# Patient Record
Sex: MALE | Race: WHITE | Employment: FULL TIME | URBAN - METROPOLITAN AREA
[De-identification: names, ages, dates, MRNs, and addresses within clinical notes are randomized per-mention and may not be internally consistent; named-entity substitution may affect disease eponyms.]

---

## 2018-07-28 PROBLEM — Z92.89 HISTORY OF NUCLEAR STRESS TEST: Status: ACTIVE | Noted: 2018-07-28

## 2018-07-30 ENCOUNTER — OFFICE VISIT (OUTPATIENT)
Dept: CARDIOLOGY CLINIC | Age: 53
End: 2018-07-30
Payer: COMMERCIAL

## 2018-07-30 VITALS
SYSTOLIC BLOOD PRESSURE: 112 MMHG | DIASTOLIC BLOOD PRESSURE: 80 MMHG | BODY MASS INDEX: 31.7 KG/M2 | HEIGHT: 74 IN | WEIGHT: 247 LBS | HEART RATE: 55 BPM

## 2018-07-30 DIAGNOSIS — I48.0 PAROXYSMAL ATRIAL FIBRILLATION (HCC): ICD-10-CM

## 2018-07-30 DIAGNOSIS — Z92.89 HISTORY OF NUCLEAR STRESS TEST: ICD-10-CM

## 2018-07-30 DIAGNOSIS — R07.2 PRECORDIAL PAIN: Primary | ICD-10-CM

## 2018-07-30 PROCEDURE — 93000 ELECTROCARDIOGRAM COMPLETE: CPT | Performed by: INTERNAL MEDICINE

## 2018-07-30 PROCEDURE — 99244 OFF/OP CNSLTJ NEW/EST MOD 40: CPT | Performed by: INTERNAL MEDICINE

## 2018-07-30 RX ORDER — NEBIVOLOL 5 MG/1
5 TABLET ORAL DAILY
COMMUNITY

## 2018-07-30 NOTE — PROGRESS NOTES
Chief Complaint   Patient presents with    Abnormal Test Results       Patient Active Problem List    Diagnosis Date Noted    Paroxysmal atrial fibrillation ([facility] [address] 07/30/2018     Overview Note:     A.  TANA guided DC cardioversion ([place]):  11/2017      History of nuclear stress test 07/28/2018     Overview Note:     A. Pharm stress 7/26/2018 [place] Muhammad):  \"medium defect of moderate intensity involving the inferior, inferoseptal and inferoapical walls. .moderate reversibility\"  EF 55%         Current Outpatient Prescriptions   Medication Sig Dispense Refill    nebivolol (BYSTOLIC) 5 MG tablet Take 5 mg by mouth daily      rivaroxaban (XARELTO) 20 MG TABS tablet Take 20 mg by mouth      pitavastatin (LIVALO) 4 MG TABS tablet Take by mouth nightly      dapagliflozin (FARXIGA) 5 MG tablet Take 5 mg by mouth every morning       No current facility-administered medications for this visit. No Known Allergies    Vitals:    07/30/18 1138   BP: 112/80   Pulse: 55   Weight: 247 lb (112 kg)   Height: 6' 2\" (1.88 m)       Social History     Social History    Marital status:      Spouse name: N/A    Number of children: N/A    Years of education: N/A     Occupational History    Not on file. Social History Main Topics    Smoking status: Never Smoker    Smokeless tobacco: Never Used    Alcohol use Yes    Drug use: No    Sexual activity: Not on file     Other Topics Concern    Not on file     Social History Narrative    No narrative on file       History reviewed. No pertinent family history. SUBJECTIVE: Sissy Parra presents to the office today for consult (dr Mannie Duarte) for -evaluation of cardiac diagnoses and abnormal outside stress. Percell Brakeman He complains of chest pain and denies   dyspnea and syncope. 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   Had dc cardioversion last fall with [employer]. Now with intermittent left sided chest discomfort, at rest and with activity, short duration.  No bleeds, no neuro pros/cons of both  We have decided on former - will do after bystolic washout  Limit etoh to 2 drinks at a time    Nolvia Wallace M.D  [facility]  \

## 2018-08-03 ENCOUNTER — HOSPITAL ENCOUNTER (OUTPATIENT)
Dept: CARDIOLOGY | Age: 53
Discharge: HOME | End: 2018-08-03
Payer: COMMERCIAL

## 2018-08-03 VITALS
DIASTOLIC BLOOD PRESSURE: 70 MMHG | HEIGHT: 74 IN | HEART RATE: 77 BPM | WEIGHT: 247 LBS | BODY MASS INDEX: 31.7 KG/M2 | SYSTOLIC BLOOD PRESSURE: 114 MMHG

## 2018-08-03 DIAGNOSIS — R07.2 PRECORDIAL PAIN: ICD-10-CM

## 2018-08-03 PROCEDURE — 3430000000 HC RX DIAGNOSTIC RADIOPHARMACEUTICAL: Performed by: INTERNAL MEDICINE

## 2018-08-03 PROCEDURE — 93017 CV STRESS TEST TRACING ONLY: CPT

## 2018-08-03 PROCEDURE — 78452 HT MUSCLE IMAGE SPECT MULT: CPT

## 2018-08-03 PROCEDURE — A9500 TC99M SESTAMIBI: HCPCS | Performed by: INTERNAL MEDICINE

## 2018-08-03 PROCEDURE — 2580000003 HC RX 258: Performed by: INTERNAL MEDICINE

## 2018-08-03 RX ORDER — SODIUM CHLORIDE 0.9 % (FLUSH) 0.9 %
10 SYRINGE (ML) INJECTION PRN
Status: DISCONTINUED | OUTPATIENT
Start: 2018-08-03 | End: 2018-08-04 | Stop reason: HOSPADM

## 2018-08-03 RX ADMIN — Medication 10.3 MILLICURIE: at 06:41

## 2018-08-03 RX ADMIN — Medication 10 ML: at 07:57

## 2018-08-03 RX ADMIN — Medication 10 ML: at 06:42

## 2018-08-03 RX ADMIN — Medication 31.4 MILLICURIE: at 07:57

## 2018-08-03 NOTE — PROCEDURES
[address] and [facility] [address] [phone]                Exercise Stress Nuclear Gated SPECT Study    Name: [facility] Account Number: [de-identified]    :  1965      Sex: male              Date of Study:  8/3/2018    Height: 6' 2\" (188 cm)  Weight: 247 lb (112 kg)     Ordering Provider: Jolanta Montoya MD          PCP: Dorene Vasquez MD      Cardiologist: Jolanta Montoya MD                       Interpreting Physician:Tom Montoya MD   _________________________________________________________________________________    Indication:   Detecting the presence and location of coronary artery disease    Clinical History:   Patient has no known history of coronary artery disease. Exercise: The patient exercised using a Dami protocol, completing 9:37 minutes and reaching an estimated work load of 53.2 metabolic equivalents (METS). Resting HR was 64. Peak exercise heart rate was 138 ( 83% of maximum predicted heart rate for age). Baseline /74. Peak exercise /78. The blood pressure response to exercise was normal      Exercise was terminated due to fatigue, target achieved. The patient experienced no chest pain with exercise. I personally was present and attest to NO angina                Exercise ECG:   The patient demonstrated rare PVC during exercise. With exercise, there were no ST segment changes of significance at the heart rate achieved. IMAGING: Myocardial perfusion imaging was performed at rest 30-35 minutes following the intravenous injection of 10.3 mCi of (Tc-Sestamibi) followed by 10 ml of Normal Saline. At peak exercise, the patient was injected intravenously with 31.4 mCi of (Tc-Sestamibi) followed by 10 ml of Normal Saline. Gated post-stress tomographic imaging was performed 20-25 minutes after stress. FINDINGS: The overall quality of the study was excellent.

## 2018-08-06 ENCOUNTER — TELEPHONE (OUTPATIENT)
Dept: CARDIOLOGY CLINIC | Age: 53
End: 2018-08-06

## 2022-06-23 ENCOUNTER — HOSPITAL ENCOUNTER (INPATIENT)
Age: 57
LOS: 4 days | Discharge: HOME HEALTH | DRG: 391 | End: 2022-06-27
Attending: EMERGENCY MEDICINE | Admitting: INTERNAL MEDICINE
Payer: COMMERCIAL

## 2022-06-23 ENCOUNTER — APPOINTMENT (OUTPATIENT)
Dept: CT IMAGING | Age: 57
DRG: 391 | End: 2022-06-23
Payer: COMMERCIAL

## 2022-06-23 DIAGNOSIS — K57.32 DIVERTICULITIS OF COLON: Primary | ICD-10-CM

## 2022-06-23 DIAGNOSIS — E13.10 DIABETIC KETOACIDOSIS WITHOUT COMA ASSOCIATED WITH OTHER SPECIFIED DIABETES MELLITUS (HCC): ICD-10-CM

## 2022-06-23 PROBLEM — E11.10: Status: ACTIVE | Noted: 2022-06-23

## 2022-06-23 LAB
ALBUMIN SERPL-MCNC: 4.5 G/DL (ref 3.5–5.2)
ALP BLD-CCNC: 52 U/L (ref 40–129)
ALT SERPL-CCNC: 11 U/L (ref 0–40)
ANION GAP SERPL CALCULATED.3IONS-SCNC: 25 MMOL/L (ref 7–16)
AST SERPL-CCNC: 14 U/L (ref 0–39)
BACTERIA: NORMAL /HPF
BASOPHILS ABSOLUTE: 0.04 E9/L (ref 0–0.2)
BASOPHILS RELATIVE PERCENT: 0.2 % (ref 0–2)
BETA-HYDROXYBUTYRATE: >4.5 MMOL/L (ref 0.02–0.27)
BILIRUB SERPL-MCNC: 2.1 MG/DL (ref 0–1.2)
BILIRUBIN URINE: ABNORMAL
BLOOD, URINE: ABNORMAL
BUN BLDV-MCNC: 17 MG/DL (ref 6–20)
CALCIUM SERPL-MCNC: 9.5 MG/DL (ref 8.6–10.2)
CHLORIDE BLD-SCNC: 98 MMOL/L (ref 98–107)
CLARITY: CLEAR
CO2: 13 MMOL/L (ref 22–29)
COLOR: YELLOW
CREAT SERPL-MCNC: 1 MG/DL (ref 0.7–1.2)
EOSINOPHILS ABSOLUTE: 0.01 E9/L (ref 0.05–0.5)
EOSINOPHILS RELATIVE PERCENT: 0.1 % (ref 0–6)
GFR AFRICAN AMERICAN: >60
GFR NON-AFRICAN AMERICAN: >60 ML/MIN/1.73
GLUCOSE BLD-MCNC: 103 MG/DL (ref 74–99)
GLUCOSE BLD-MCNC: 84 MG/DL
GLUCOSE URINE: 500 MG/DL
HCT VFR BLD CALC: 45.5 % (ref 37–54)
HEMOGLOBIN: 15 G/DL (ref 12.5–16.5)
IMMATURE GRANULOCYTES #: 0.14 E9/L
IMMATURE GRANULOCYTES %: 0.7 % (ref 0–5)
KETONES, URINE: >=80 MG/DL
LACTIC ACID: 0.7 MMOL/L (ref 0.5–2.2)
LACTIC ACID: 1.1 MMOL/L (ref 0.5–2.2)
LEUKOCYTE ESTERASE, URINE: NEGATIVE
LIPASE: 15 U/L (ref 13–60)
LYMPHOCYTES ABSOLUTE: 1.4 E9/L (ref 1.5–4)
LYMPHOCYTES RELATIVE PERCENT: 7.4 % (ref 20–42)
MCH RBC QN AUTO: 30.2 PG (ref 26–35)
MCHC RBC AUTO-ENTMCNC: 33 % (ref 32–34.5)
MCV RBC AUTO: 91.5 FL (ref 80–99.9)
METER GLUCOSE: 84 MG/DL (ref 74–99)
MONOCYTES ABSOLUTE: 1.1 E9/L (ref 0.1–0.95)
MONOCYTES RELATIVE PERCENT: 5.8 % (ref 2–12)
NEUTROPHILS ABSOLUTE: 16.28 E9/L (ref 1.8–7.3)
NEUTROPHILS RELATIVE PERCENT: 85.8 % (ref 43–80)
NITRITE, URINE: NEGATIVE
PDW BLD-RTO: 13 FL (ref 11.5–15)
PH UA: 5.5 (ref 5–9)
PH VENOUS: 7.3 (ref 7.35–7.45)
PLATELET # BLD: 206 E9/L (ref 130–450)
PMV BLD AUTO: 9.8 FL (ref 7–12)
POTASSIUM SERPL-SCNC: 3.9 MMOL/L (ref 3.5–5)
PROTEIN UA: 100 MG/DL
RBC # BLD: 4.97 E12/L (ref 3.8–5.8)
RBC UA: NORMAL /HPF (ref 0–2)
SODIUM BLD-SCNC: 136 MMOL/L (ref 132–146)
SPECIFIC GRAVITY UA: >=1.03 (ref 1–1.03)
TOTAL PROTEIN: 7.7 G/DL (ref 6.4–8.3)
UROBILINOGEN, URINE: 0.2 E.U./DL
WBC # BLD: 19 E9/L (ref 4.5–11.5)
WBC UA: NORMAL /HPF (ref 0–5)

## 2022-06-23 PROCEDURE — 80053 COMPREHEN METABOLIC PANEL: CPT

## 2022-06-23 PROCEDURE — 96374 THER/PROPH/DIAG INJ IV PUSH: CPT

## 2022-06-23 PROCEDURE — 6360000002 HC RX W HCPCS: Performed by: EMERGENCY MEDICINE

## 2022-06-23 PROCEDURE — 81001 URINALYSIS AUTO W/SCOPE: CPT

## 2022-06-23 PROCEDURE — 82010 KETONE BODYS QUAN: CPT

## 2022-06-23 PROCEDURE — 87040 BLOOD CULTURE FOR BACTERIA: CPT

## 2022-06-23 PROCEDURE — 74177 CT ABD & PELVIS W/CONTRAST: CPT

## 2022-06-23 PROCEDURE — 6370000000 HC RX 637 (ALT 250 FOR IP): Performed by: EMERGENCY MEDICINE

## 2022-06-23 PROCEDURE — 82800 BLOOD PH: CPT

## 2022-06-23 PROCEDURE — 6360000004 HC RX CONTRAST MEDICATION: Performed by: RADIOLOGY

## 2022-06-23 PROCEDURE — 99285 EMERGENCY DEPT VISIT HI MDM: CPT

## 2022-06-23 PROCEDURE — 36415 COLL VENOUS BLD VENIPUNCTURE: CPT

## 2022-06-23 PROCEDURE — 96361 HYDRATE IV INFUSION ADD-ON: CPT

## 2022-06-23 PROCEDURE — 2000000000 HC ICU R&B

## 2022-06-23 PROCEDURE — 96375 TX/PRO/DX INJ NEW DRUG ADDON: CPT

## 2022-06-23 PROCEDURE — 82962 GLUCOSE BLOOD TEST: CPT

## 2022-06-23 PROCEDURE — 2580000003 HC RX 258: Performed by: EMERGENCY MEDICINE

## 2022-06-23 PROCEDURE — 85025 COMPLETE CBC W/AUTO DIFF WBC: CPT

## 2022-06-23 PROCEDURE — 83690 ASSAY OF LIPASE: CPT

## 2022-06-23 PROCEDURE — 83605 ASSAY OF LACTIC ACID: CPT

## 2022-06-23 RX ORDER — MAGNESIUM SULFATE 1 G/100ML
1000 INJECTION INTRAVENOUS PRN
Status: DISCONTINUED | OUTPATIENT
Start: 2022-06-23 | End: 2022-06-24

## 2022-06-23 RX ORDER — 0.9 % SODIUM CHLORIDE 0.9 %
1000 INTRAVENOUS SOLUTION INTRAVENOUS ONCE
Status: COMPLETED | OUTPATIENT
Start: 2022-06-23 | End: 2022-06-23

## 2022-06-23 RX ORDER — DEXTROSE AND SODIUM CHLORIDE 5; .45 G/100ML; G/100ML
INJECTION, SOLUTION INTRAVENOUS CONTINUOUS PRN
Status: DISCONTINUED | OUTPATIENT
Start: 2022-06-23 | End: 2022-06-24

## 2022-06-23 RX ORDER — SODIUM CHLORIDE 9 MG/ML
INJECTION, SOLUTION INTRAVENOUS CONTINUOUS
Status: DISCONTINUED | OUTPATIENT
Start: 2022-06-23 | End: 2022-06-24

## 2022-06-23 RX ORDER — POTASSIUM CHLORIDE 7.45 MG/ML
10 INJECTION INTRAVENOUS PRN
Status: DISCONTINUED | OUTPATIENT
Start: 2022-06-23 | End: 2022-06-24

## 2022-06-23 RX ORDER — KETOROLAC TROMETHAMINE 30 MG/ML
30 INJECTION, SOLUTION INTRAMUSCULAR; INTRAVENOUS ONCE
Status: COMPLETED | OUTPATIENT
Start: 2022-06-23 | End: 2022-06-23

## 2022-06-23 RX ADMIN — PIPERACILLIN SODIUM AND TAZOBACTAM SODIUM 4500 MG: 4; .5 INJECTION, POWDER, LYOPHILIZED, FOR SOLUTION INTRAVENOUS at 23:29

## 2022-06-23 RX ADMIN — SODIUM CHLORIDE 0.1 UNITS/KG/HR: 9 INJECTION, SOLUTION INTRAVENOUS at 23:37

## 2022-06-23 RX ADMIN — DEXTROSE AND SODIUM CHLORIDE: 5; 450 INJECTION, SOLUTION INTRAVENOUS at 23:33

## 2022-06-23 RX ADMIN — IOPAMIDOL 50 ML: 755 INJECTION, SOLUTION INTRAVENOUS at 21:51

## 2022-06-23 RX ADMIN — SODIUM CHLORIDE 1000 ML: 9 INJECTION, SOLUTION INTRAVENOUS at 20:38

## 2022-06-23 RX ADMIN — SODIUM CHLORIDE 1000 ML: 9 INJECTION, SOLUTION INTRAVENOUS at 21:42

## 2022-06-23 RX ADMIN — KETOROLAC TROMETHAMINE 30 MG: 30 INJECTION, SOLUTION INTRAMUSCULAR; INTRAVENOUS at 20:42

## 2022-06-23 ASSESSMENT — PAIN - FUNCTIONAL ASSESSMENT: PAIN_FUNCTIONAL_ASSESSMENT: 0-10

## 2022-06-23 ASSESSMENT — PAIN DESCRIPTION - LOCATION
LOCATION: ABDOMEN
LOCATION: ABDOMEN

## 2022-06-23 ASSESSMENT — PAIN DESCRIPTION - DESCRIPTORS
DESCRIPTORS: ACHING
DESCRIPTORS: ACHING

## 2022-06-23 ASSESSMENT — ENCOUNTER SYMPTOMS
ABDOMINAL DISTENTION: 0
BACK PAIN: 0
COUGH: 0
SHORTNESS OF BREATH: 0

## 2022-06-23 ASSESSMENT — PAIN SCALES - GENERAL
PAINLEVEL_OUTOF10: 6
PAINLEVEL_OUTOF10: 8

## 2022-06-23 ASSESSMENT — PAIN DESCRIPTION - ORIENTATION
ORIENTATION: LOWER
ORIENTATION: LOWER

## 2022-06-23 NOTE — ED NOTES
Department of Emergency Medicine  FIRST PROVIDER TRIAGE NOTE             Independent MLP           6/23/22  5:53 PM EDT    Date of Encounter: 6/23/22   MRN: [id number]      HPI: Kimi Martin is a 61 y.o. male who presents to the ED for Abdominal Pain (lower mid abd pain, 3 days, unable to have bm, used emena. ) and Nausea     Pt presenting with lower abdominal pain, distended, nausea x 3 days. Tried a few enemas with no improvement. Unable to eat    ROS: Negative for cp or sob. PE: Gen Appearance/Constitutional: alert  HEENT: NC/NT. PERRLA,  Airway patent. Initial Plan of Care: All treatment areas with department are currently occupied. Plan to order/Initiate the following while awaiting opening in ED: labs.   Initiate Treatment-Testing, Proceed toTreatment Area When Bed Available for ED Attending/MLP to Continue Care    Electronically signed by Lily Hook PA-C   DD: 6/23/22       Lily Hook PA-C  06/23/22 1354

## 2022-06-24 PROBLEM — K57.92 DIVERTICULITIS: Status: ACTIVE | Noted: 2022-06-24

## 2022-06-24 PROBLEM — G47.33 OSA (OBSTRUCTIVE SLEEP APNEA): Status: ACTIVE | Noted: 2022-06-24

## 2022-06-24 LAB
ADENOVIRUS BY PCR: NOT DETECTED
ANION GAP SERPL CALCULATED.3IONS-SCNC: 14 MMOL/L (ref 7–16)
ANION GAP SERPL CALCULATED.3IONS-SCNC: 16 MMOL/L (ref 7–16)
ANION GAP SERPL CALCULATED.3IONS-SCNC: 17 MMOL/L (ref 7–16)
APTT: 27.8 SEC (ref 24.5–35.1)
B.E.: -8.7 MMOL/L (ref -3–3)
BASOPHILS ABSOLUTE: 0.02 E9/L (ref 0–0.2)
BASOPHILS RELATIVE PERCENT: 0.1 % (ref 0–2)
BORDETELLA PARAPERTUSSIS BY PCR: NOT DETECTED
BORDETELLA PERTUSSIS BY PCR: NOT DETECTED
BUN BLDV-MCNC: 12 MG/DL (ref 6–20)
BUN BLDV-MCNC: 14 MG/DL (ref 6–20)
BUN BLDV-MCNC: 17 MG/DL (ref 6–20)
CALCIUM SERPL-MCNC: 7.6 MG/DL (ref 8.6–10.2)
CALCIUM SERPL-MCNC: 7.9 MG/DL (ref 8.6–10.2)
CALCIUM SERPL-MCNC: 8.1 MG/DL (ref 8.6–10.2)
CHLAMYDOPHILIA PNEUMONIAE BY PCR: NOT DETECTED
CHLORIDE BLD-SCNC: 102 MMOL/L (ref 98–107)
CHLORIDE BLD-SCNC: 103 MMOL/L (ref 98–107)
CHLORIDE BLD-SCNC: 106 MMOL/L (ref 98–107)
CO2: 14 MMOL/L (ref 22–29)
CO2: 14 MMOL/L (ref 22–29)
CO2: 15 MMOL/L (ref 22–29)
COHB: 0.9 % (ref 0–1.5)
CORONAVIRUS 229E BY PCR: NOT DETECTED
CORONAVIRUS HKU1 BY PCR: NOT DETECTED
CORONAVIRUS NL63 BY PCR: NOT DETECTED
CORONAVIRUS OC43 BY PCR: NOT DETECTED
CREAT SERPL-MCNC: 0.8 MG/DL (ref 0.7–1.2)
CREAT SERPL-MCNC: 0.9 MG/DL (ref 0.7–1.2)
CREAT SERPL-MCNC: 0.9 MG/DL (ref 0.7–1.2)
CRITICAL: ABNORMAL
DATE ANALYZED: ABNORMAL
DATE OF COLLECTION: ABNORMAL
EOSINOPHILS ABSOLUTE: 0.07 E9/L (ref 0.05–0.5)
EOSINOPHILS RELATIVE PERCENT: 0.5 % (ref 0–6)
GFR AFRICAN AMERICAN: >60
GFR NON-AFRICAN AMERICAN: >60 ML/MIN/1.73
GLUCOSE BLD-MCNC: 137 MG/DL (ref 74–99)
GLUCOSE BLD-MCNC: 160 MG/DL (ref 74–99)
GLUCOSE BLD-MCNC: 82 MG/DL (ref 74–99)
GLUCOSE BLD-MCNC: 99 MG/DL
HBA1C MFR BLD: 6.8 % (ref 4–5.6)
HCO3: 14.5 MMOL/L (ref 22–26)
HCT VFR BLD CALC: 36.3 % (ref 37–54)
HEMOGLOBIN: 12.2 G/DL (ref 12.5–16.5)
HHB: 3.9 % (ref 0–5)
HUMAN METAPNEUMOVIRUS BY PCR: NOT DETECTED
HUMAN RHINOVIRUS/ENTEROVIRUS BY PCR: NOT DETECTED
IMMATURE GRANULOCYTES #: 0.07 E9/L
IMMATURE GRANULOCYTES %: 0.5 % (ref 0–5)
INFLUENZA A BY PCR: NOT DETECTED
INFLUENZA B BY PCR: NOT DETECTED
INR BLD: 1.4
LAB: ABNORMAL
LACTIC ACID: 0.8 MMOL/L (ref 0.5–2.2)
LYMPHOCYTES ABSOLUTE: 1.31 E9/L (ref 1.5–4)
LYMPHOCYTES RELATIVE PERCENT: 9.8 % (ref 20–42)
Lab: ABNORMAL
MAGNESIUM: 2 MG/DL (ref 1.6–2.6)
MCH RBC QN AUTO: 29.8 PG (ref 26–35)
MCHC RBC AUTO-ENTMCNC: 33.6 % (ref 32–34.5)
MCV RBC AUTO: 88.8 FL (ref 80–99.9)
METER GLUCOSE: 103 MG/DL (ref 74–99)
METER GLUCOSE: 112 MG/DL (ref 74–99)
METER GLUCOSE: 125 MG/DL (ref 74–99)
METER GLUCOSE: 136 MG/DL (ref 74–99)
METER GLUCOSE: 141 MG/DL (ref 74–99)
METER GLUCOSE: 141 MG/DL (ref 74–99)
METER GLUCOSE: 152 MG/DL (ref 74–99)
METER GLUCOSE: 74 MG/DL (ref 74–99)
METER GLUCOSE: 81 MG/DL (ref 74–99)
METER GLUCOSE: 97 MG/DL (ref 74–99)
METER GLUCOSE: 98 MG/DL (ref 74–99)
METER GLUCOSE: 99 MG/DL (ref 74–99)
METHB: 0.3 % (ref 0–1.5)
MODE: ABNORMAL
MONOCYTES ABSOLUTE: 0.91 E9/L (ref 0.1–0.95)
MONOCYTES RELATIVE PERCENT: 6.8 % (ref 2–12)
MYCOPLASMA PNEUMONIAE BY PCR: NOT DETECTED
NEUTROPHILS ABSOLUTE: 10.96 E9/L (ref 1.8–7.3)
NEUTROPHILS RELATIVE PERCENT: 82.3 % (ref 43–80)
O2 CONTENT: 17.3 ML/DL
O2 SATURATION: 96.1 % (ref 92–98.5)
O2HB: 94.9 % (ref 94–97)
OPERATOR ID: 46
PARAINFLUENZA VIRUS 1 BY PCR: NOT DETECTED
PARAINFLUENZA VIRUS 2 BY PCR: NOT DETECTED
PARAINFLUENZA VIRUS 3 BY PCR: NOT DETECTED
PARAINFLUENZA VIRUS 4 BY PCR: NOT DETECTED
PATIENT TEMP: 37 C
PCO2: 24.5 MMHG (ref 35–45)
PDW BLD-RTO: 12.9 FL (ref 11.5–15)
PH BLOOD GAS: 7.39 (ref 7.35–7.45)
PHOSPHORUS: 1.5 MG/DL (ref 2.5–4.5)
PHOSPHORUS: 1.6 MG/DL (ref 2.5–4.5)
PHOSPHORUS: 2.4 MG/DL (ref 2.5–4.5)
PLATELET # BLD: 169 E9/L (ref 130–450)
PMV BLD AUTO: 9.9 FL (ref 7–12)
PO2: 77.4 MMHG (ref 75–100)
POTASSIUM SERPL-SCNC: 3.4 MMOL/L (ref 3.5–5)
POTASSIUM SERPL-SCNC: 3.9 MMOL/L (ref 3.5–5)
POTASSIUM SERPL-SCNC: 4 MMOL/L (ref 3.5–5)
PROTHROMBIN TIME: 15.3 SEC (ref 9.3–12.4)
RBC # BLD: 4.09 E12/L (ref 3.8–5.8)
RESPIRATORY SYNCYTIAL VIRUS BY PCR: NOT DETECTED
SARS-COV-2, PCR: NOT DETECTED
SODIUM BLD-SCNC: 132 MMOL/L (ref 132–146)
SODIUM BLD-SCNC: 133 MMOL/L (ref 132–146)
SODIUM BLD-SCNC: 136 MMOL/L (ref 132–146)
SOURCE, BLOOD GAS: ABNORMAL
THB: 12.9 G/DL (ref 11.5–16.5)
TIME ANALYZED: 736
WBC # BLD: 13.3 E9/L (ref 4.5–11.5)

## 2022-06-24 PROCEDURE — 2580000003 HC RX 258: Performed by: INTERNAL MEDICINE

## 2022-06-24 PROCEDURE — 6370000000 HC RX 637 (ALT 250 FOR IP): Performed by: INTERNAL MEDICINE

## 2022-06-24 PROCEDURE — 85025 COMPLETE CBC W/AUTO DIFF WBC: CPT

## 2022-06-24 PROCEDURE — 84100 ASSAY OF PHOSPHORUS: CPT

## 2022-06-24 PROCEDURE — 36591 DRAW BLOOD OFF VENOUS DEVICE: CPT

## 2022-06-24 PROCEDURE — 0202U NFCT DS 22 TRGT SARS-COV-2: CPT

## 2022-06-24 PROCEDURE — 2500000003 HC RX 250 WO HCPCS: Performed by: INTERNAL MEDICINE

## 2022-06-24 PROCEDURE — 6360000002 HC RX W HCPCS: Performed by: INTERNAL MEDICINE

## 2022-06-24 PROCEDURE — 6370000000 HC RX 637 (ALT 250 FOR IP)

## 2022-06-24 PROCEDURE — 82805 BLOOD GASES W/O2 SATURATION: CPT

## 2022-06-24 PROCEDURE — 6360000002 HC RX W HCPCS: Performed by: STUDENT IN AN ORGANIZED HEALTH CARE EDUCATION/TRAINING PROGRAM

## 2022-06-24 PROCEDURE — 83735 ASSAY OF MAGNESIUM: CPT

## 2022-06-24 PROCEDURE — 85730 THROMBOPLASTIN TIME PARTIAL: CPT

## 2022-06-24 PROCEDURE — 6360000002 HC RX W HCPCS: Performed by: EMERGENCY MEDICINE

## 2022-06-24 PROCEDURE — 83605 ASSAY OF LACTIC ACID: CPT

## 2022-06-24 PROCEDURE — 80048 BASIC METABOLIC PNL TOTAL CA: CPT

## 2022-06-24 PROCEDURE — 2580000003 HC RX 258: Performed by: EMERGENCY MEDICINE

## 2022-06-24 PROCEDURE — 85610 PROTHROMBIN TIME: CPT

## 2022-06-24 PROCEDURE — 82962 GLUCOSE BLOOD TEST: CPT

## 2022-06-24 PROCEDURE — 36415 COLL VENOUS BLD VENIPUNCTURE: CPT

## 2022-06-24 PROCEDURE — 87081 CULTURE SCREEN ONLY: CPT

## 2022-06-24 PROCEDURE — 83036 HEMOGLOBIN GLYCOSYLATED A1C: CPT

## 2022-06-24 PROCEDURE — 1200000000 HC SEMI PRIVATE

## 2022-06-24 PROCEDURE — 36600 WITHDRAWAL OF ARTERIAL BLOOD: CPT

## 2022-06-24 PROCEDURE — 2500000003 HC RX 250 WO HCPCS: Performed by: EMERGENCY MEDICINE

## 2022-06-24 RX ORDER — INSULIN LISPRO 100 [IU]/ML
0-6 INJECTION, SOLUTION INTRAVENOUS; SUBCUTANEOUS NIGHTLY
Status: DISCONTINUED | OUTPATIENT
Start: 2022-06-24 | End: 2022-06-27 | Stop reason: HOSPADM

## 2022-06-24 RX ORDER — METOPROLOL SUCCINATE 50 MG/1
50 TABLET, EXTENDED RELEASE ORAL DAILY
Status: DISCONTINUED | OUTPATIENT
Start: 2022-06-24 | End: 2022-06-27 | Stop reason: HOSPADM

## 2022-06-24 RX ORDER — ENOXAPARIN SODIUM 150 MG/ML
1 INJECTION SUBCUTANEOUS 2 TIMES DAILY
Status: DISCONTINUED | OUTPATIENT
Start: 2022-06-24 | End: 2022-06-27 | Stop reason: HOSPADM

## 2022-06-24 RX ORDER — SODIUM CHLORIDE 0.9 % (FLUSH) 0.9 %
SYRINGE (ML) INJECTION
Status: DISPENSED
Start: 2022-06-24 | End: 2022-06-24

## 2022-06-24 RX ORDER — POLYETHYLENE GLYCOL 3350 17 G/17G
17 POWDER, FOR SOLUTION ORAL DAILY
Status: DISCONTINUED | OUTPATIENT
Start: 2022-06-24 | End: 2022-06-27 | Stop reason: HOSPADM

## 2022-06-24 RX ORDER — SENNA PLUS 8.6 MG/1
1 TABLET ORAL NIGHTLY
Status: DISCONTINUED | OUTPATIENT
Start: 2022-06-24 | End: 2022-06-27 | Stop reason: HOSPADM

## 2022-06-24 RX ORDER — KETOROLAC TROMETHAMINE 30 MG/ML
30 INJECTION, SOLUTION INTRAMUSCULAR; INTRAVENOUS EVERY 6 HOURS
Status: DISCONTINUED | OUTPATIENT
Start: 2022-06-24 | End: 2022-06-27 | Stop reason: HOSPADM

## 2022-06-24 RX ORDER — DOCUSATE SODIUM 100 MG/1
100 CAPSULE, LIQUID FILLED ORAL 2 TIMES DAILY
Status: DISCONTINUED | OUTPATIENT
Start: 2022-06-24 | End: 2022-06-27 | Stop reason: HOSPADM

## 2022-06-24 RX ORDER — PANTOPRAZOLE SODIUM 40 MG/10ML
40 INJECTION, POWDER, LYOPHILIZED, FOR SOLUTION INTRAVENOUS DAILY
Status: DISCONTINUED | OUTPATIENT
Start: 2022-06-24 | End: 2022-06-24

## 2022-06-24 RX ORDER — DEXTROSE, SODIUM CHLORIDE, AND POTASSIUM CHLORIDE 5; .45; .15 G/100ML; G/100ML; G/100ML
INJECTION INTRAVENOUS CONTINUOUS PRN
Status: DISCONTINUED | OUTPATIENT
Start: 2022-06-24 | End: 2022-06-24

## 2022-06-24 RX ORDER — BEMPEDOIC ACID AND EZETIMIBE 180; 10 MG/1; MG/1
1 TABLET, FILM COATED ORAL DAILY
COMMUNITY

## 2022-06-24 RX ORDER — MORPHINE SULFATE 2 MG/ML
1 INJECTION, SOLUTION INTRAMUSCULAR; INTRAVENOUS EVERY 4 HOURS PRN
Status: DISCONTINUED | OUTPATIENT
Start: 2022-06-24 | End: 2022-06-27 | Stop reason: HOSPADM

## 2022-06-24 RX ORDER — MORPHINE SULFATE 2 MG/ML
1 INJECTION, SOLUTION INTRAMUSCULAR; INTRAVENOUS EVERY 4 HOURS PRN
Status: DISCONTINUED | OUTPATIENT
Start: 2022-06-24 | End: 2022-06-24

## 2022-06-24 RX ORDER — INSULIN LISPRO 100 [IU]/ML
0-12 INJECTION, SOLUTION INTRAVENOUS; SUBCUTANEOUS
Status: DISCONTINUED | OUTPATIENT
Start: 2022-06-24 | End: 2022-06-27 | Stop reason: HOSPADM

## 2022-06-24 RX ORDER — ERTUGLIFLOZIN AND METFORMIN HYDROCHLORIDE 7.5; 1 MG/1; MG/1
1 TABLET, FILM COATED ORAL DAILY
COMMUNITY

## 2022-06-24 RX ORDER — SODIUM CHLORIDE, SODIUM LACTATE, POTASSIUM CHLORIDE, CALCIUM CHLORIDE 600; 310; 30; 20 MG/100ML; MG/100ML; MG/100ML; MG/100ML
INJECTION, SOLUTION INTRAVENOUS CONTINUOUS
Status: DISCONTINUED | OUTPATIENT
Start: 2022-06-24 | End: 2022-06-25

## 2022-06-24 RX ORDER — SODIUM BICARBONATE 650 MG/1
650 TABLET ORAL 4 TIMES DAILY
Status: CANCELLED | OUTPATIENT
Start: 2022-06-24

## 2022-06-24 RX ADMIN — MORPHINE SULFATE 1 MG: 2 INJECTION, SOLUTION INTRAMUSCULAR; INTRAVENOUS at 16:06

## 2022-06-24 RX ADMIN — KETOROLAC TROMETHAMINE 30 MG: 30 INJECTION, SOLUTION INTRAMUSCULAR; INTRAVENOUS at 18:29

## 2022-06-24 RX ADMIN — PIPERACILLIN AND TAZOBACTAM 4500 MG: 4; .5 INJECTION, POWDER, LYOPHILIZED, FOR SOLUTION INTRAVENOUS at 22:27

## 2022-06-24 RX ADMIN — POTASSIUM CHLORIDE, DEXTROSE MONOHYDRATE AND SODIUM CHLORIDE: 150; 5; 450 INJECTION, SOLUTION INTRAVENOUS at 08:09

## 2022-06-24 RX ADMIN — DOCUSATE SODIUM 100 MG: 100 CAPSULE, LIQUID FILLED ORAL at 10:44

## 2022-06-24 RX ADMIN — POTASSIUM CHLORIDE 10 MEQ: 10 INJECTION, SOLUTION INTRAVENOUS at 04:27

## 2022-06-24 RX ADMIN — DEXTROSE AND SODIUM CHLORIDE: 5; 450 INJECTION, SOLUTION INTRAVENOUS at 06:09

## 2022-06-24 RX ADMIN — POTASSIUM CHLORIDE 10 MEQ: 10 INJECTION, SOLUTION INTRAVENOUS at 06:29

## 2022-06-24 RX ADMIN — PIPERACILLIN AND TAZOBACTAM 4500 MG: 4; .5 INJECTION, POWDER, LYOPHILIZED, FOR SOLUTION INTRAVENOUS at 06:23

## 2022-06-24 RX ADMIN — SODIUM CHLORIDE, POTASSIUM CHLORIDE, SODIUM LACTATE AND CALCIUM CHLORIDE: 600; 310; 30; 20 INJECTION, SOLUTION INTRAVENOUS at 10:48

## 2022-06-24 RX ADMIN — DOCUSATE SODIUM 100 MG: 100 CAPSULE, LIQUID FILLED ORAL at 21:38

## 2022-06-24 RX ADMIN — INSULIN LISPRO 2 UNITS: 100 INJECTION, SOLUTION INTRAVENOUS; SUBCUTANEOUS at 12:07

## 2022-06-24 RX ADMIN — INSULIN LISPRO 1 UNITS: 100 INJECTION, SOLUTION INTRAVENOUS; SUBCUTANEOUS at 21:44

## 2022-06-24 RX ADMIN — METOPROLOL SUCCINATE 50 MG: 50 TABLET, EXTENDED RELEASE ORAL at 10:44

## 2022-06-24 RX ADMIN — ENOXAPARIN SODIUM 105 MG: 150 INJECTION SUBCUTANEOUS at 21:37

## 2022-06-24 RX ADMIN — PIPERACILLIN AND TAZOBACTAM 4500 MG: 4; .5 INJECTION, POWDER, LYOPHILIZED, FOR SOLUTION INTRAVENOUS at 13:12

## 2022-06-24 RX ADMIN — DEXTROSE MONOHYDRATE 15 MMOL: 5 INJECTION, SOLUTION INTRAVENOUS at 04:52

## 2022-06-24 RX ADMIN — POTASSIUM & SODIUM PHOSPHATES POWDER PACK 280-160-250 MG 500 MG: 280-160-250 PACK at 13:06

## 2022-06-24 RX ADMIN — ENOXAPARIN SODIUM 105 MG: 150 INJECTION SUBCUTANEOUS at 10:45

## 2022-06-24 RX ADMIN — POTASSIUM CHLORIDE 10 MEQ: 10 INJECTION, SOLUTION INTRAVENOUS at 05:23

## 2022-06-24 RX ADMIN — SENNOSIDES 8.6 MG: 8.6 TABLET, FILM COATED ORAL at 21:39

## 2022-06-24 ASSESSMENT — PAIN SCALES - GENERAL
PAINLEVEL_OUTOF10: 5
PAINLEVEL_OUTOF10: 5
PAINLEVEL_OUTOF10: 4
PAINLEVEL_OUTOF10: 0

## 2022-06-24 ASSESSMENT — ENCOUNTER SYMPTOMS
COUGH: 1
WHEEZING: 0
ABDOMINAL PAIN: 1
SHORTNESS OF BREATH: 1
SORE THROAT: 0
COLOR CHANGE: 0
DIARRHEA: 0
NAUSEA: 1
CONSTIPATION: 1
TROUBLE SWALLOWING: 0
COUGH: 0
ABDOMINAL DISTENTION: 1
FACIAL SWELLING: 0
NAUSEA: 0
SHORTNESS OF BREATH: 0
VOMITING: 0

## 2022-06-24 ASSESSMENT — PAIN DESCRIPTION - FREQUENCY
FREQUENCY: INTERMITTENT
FREQUENCY: INTERMITTENT

## 2022-06-24 ASSESSMENT — PAIN DESCRIPTION - ONSET: ONSET: ON-GOING

## 2022-06-24 ASSESSMENT — PAIN DESCRIPTION - LOCATION
LOCATION: ABDOMEN

## 2022-06-24 ASSESSMENT — PAIN DESCRIPTION - PAIN TYPE: TYPE: ACUTE PAIN

## 2022-06-24 ASSESSMENT — PAIN DESCRIPTION - DESCRIPTORS
DESCRIPTORS: CRAMPING;ACHING
DESCRIPTORS: CRAMPING;ACHING
DESCRIPTORS: SORE

## 2022-06-24 ASSESSMENT — PAIN DESCRIPTION - ORIENTATION
ORIENTATION: LOWER
ORIENTATION: LEFT
ORIENTATION: LEFT

## 2022-06-24 NOTE — PROGRESS NOTES
Occupational Therapy  Date:6/24/2022  Patient Name: Pablo Joseph  Room: 6964/8389-D     Occupational Therapy (OT) order received, patient's medical record reviewed, and OT evaluation attempted this morning; patient reported that he is independent with ADLs and functional transfers/mobility and declined provision of OT services during this hospitalization. Patient noted that he will have assistance with completion of IADLs, as needed, upon discharge. No OT evaluation completed, per patient preference. Karen Partida, LORETTAR/L  License Number: [id number]

## 2022-06-24 NOTE — PROGRESS NOTES
Reason for consult: DKA    A1C: 6.8%     [] Not available                     Patient states the following concerns/barriers to diabetes self-management:     [x] None       [] Medication cost   [] Food cost/availability        [] Reading  [] Hearing   [] Vision                [] Work    [] Transportation  [] No insurance  [] Physical limitations    [] Other:        Patient states the following about their diabetes/health:   [x]  Patient works at [employer]. [x]  Patient is knowledgeable about diabetes, is not interested in education at this time. [x]  Patient will monitor BG at work occasionally, will usually be between 110-130. Rarely in the upper 100s. Diabetes survival packet provided to:   [x] Patient     [] Other:    Information reviewed:   Definition of diabetes   Target glucose ranges/A1C   Self-monitoring of blood glucose   Prevention/symptoms/treatment of hypo-/hyperglycemia   Medication adherence   The plate method/meal planning guidelines   The benefits of exercise and recommendations   Reducing the risk of chronic complications      Diabetes medications reviewed (use, purpose, action):  Metformin, SGLT2            Post-education Assessment  [x]  Attentive to teaching  [x]  Answered questions appropriately when asked   [x]  Seems able to apply concepts to daily lifestyle  [x]  Seems motivated to do well  [x]  Verbalized an understanding of the plate method for portion control   [x]  Verbalized an understanding of prescribed antidiabetes medications   [x]  Verbalized an understanding of target glucose ranges/A1C level  [x]  Expresses an intent to comply with treatment plan   []  Showed very little interest in complying with treatment plan   []  Seems to have trouble applying concepts to daily lifestyle       COMMENTS:  Patient was provided with diabetes survival guide and diabetes education phone number.  Will call with any questions regarding diabetes

## 2022-06-24 NOTE — PROGRESS NOTES
.Patient admitted from ED to 208, with the following belongings - cell phone with , eyeglasses, shoes, shorts & shirt, placed on monitor, patient oriented to room and unit visiting hours. Patient guide at bedside, reviewed patient rights and responsibilities. MRSA nasal swab obtained. Bed alarm on. Call light within reach.

## 2022-06-24 NOTE — PROGRESS NOTES
Physical Therapy  Facility/Department: [address] ICU      Name: Jerrica Roca  : 1965  MRN: [id number]  Date of Service: 2022      Order received for PT evaluation. Pt up independently, denies and PT needs at this time.    Edwige Mejia PT [id number]

## 2022-06-24 NOTE — CONSULTS
Critical Care Admit/Consult Note         Patient Doron Kumar   MRN -  [id number]   [place] # - [de-identified]   - 1965      Date of Admission -  2022  8:05 PM  Date of evaluation -  2022  0208/0208-A   Hospital Day - 1            ADMIT/CONSULT DETAILS     Reason for Admit/Consult   DKA    Consulting Ester Tipton MD  Primary Care Physician - Jadyn Beltran MD         HPI   The patient is a 61 y.o. male with significant past medical history of DM type 2, HLD, PAF, JOSEFINA who presented with 3 day hx of abdominal pain and constipation. Abdominal pain initially located at upper abdomen but now present at lower abdomen, described as tight, constant, worsened with movement, alleviated moderately by lying flat. 3 day hx of very small stools with mucus. He used 2 enemas yesterday, but only resulted in moderate stool. He has also had nausea for the past 3 days, contributing to poor oral intake of both solids and liquids. He states that he has been doing some work around his house that has been causing him some SOB. He states that he had a colonoscopy many years ago that was unremarkable. He use Segluromet QD (Ertuglifozin, Metformin) for management of DM and may miss one dose a week. Hx of JOSEFINA but states that he has not used a CPAP for years. He is on chronic anticoagulation with Xarelto for PAF. He denies fevers, CP, SOB, nausea, diarrhea. He states that he is feeling hungry. Patient's mother tested positive for COVID 2 weeks ago. He tested negative with at home COVID test yesterday. On arrival to the ED pt was hemodynamically stable. Labs significant for glucose 103. B-hydroxybutyrate >4.50, WBC 19. CT Abdomen revealed sigmoid diverticulitis with microperforation. Surgery consulted from the ED for diverticulitis. Patient received 2 L bolus IVF, Toradol, started on scheduled Zosyn and Insulin drip. ICU was consulted for management of DKA.       Past Medical History Diagnosis Date    Diabetes mellitus ([facility]     Hyperlipidemia     [facility]oxysmal A-fib ([facility]     Sleep apnea         Past Surgical History           Procedure Laterality Date    CARDIOVERSION  2017    COLONOSCOPY         Social History     Socioeconomic History    Marital status:      Spouse name: None    Number of children: None    Years of education: None    Highest education level: None   Occupational History    None   Tobacco Use    Smoking status: Never Smoker    Smokeless tobacco: Never Used   Substance and Sexual Activity    Alcohol use: Yes    Drug use: No    Sexual activity: None   Other Topics Concern    None   Social History Narrative    None     Social Determinants of Health     Financial Resource Strain:     Difficulty of Paying Living Expenses: Not on file   Food Insecurity:     Worried About Running Out of Food in the Last Year: Not on file    Ajit of Food in the Last Year: Not on file   Transportation Needs:     Lack of Transportation (Medical): Not on file    Lack of Transportation (Non-Medical):  Not on file   Physical Activity:     Days of Exercise per Week: Not on file    Minutes of Exercise per Session: Not on file   Stress:     Feeling of Stress : Not on file   Social Connections:     Frequency of Communication with Friends and Family: Not on file    Frequency of Social Gatherings with Friends and Family: Not on file    Attends [employer] Services: Not on file    Active Member of [address] [place] [address] or Organizations: Not on file    Attends Club or Organization Meetings: Not on file    Marital Status: Not on file   Intimate Partner Violence:     Fear of Current or Ex-Partner: Not on file    Emotionally Abused: Not on file    Physically Abused: Not on file    Sexually Abused: Not on file   Housing Stability:     Unable to Pay for Housing in the Last Year: Not on file    Number of [place] in the Last Year: Not on file    Unstable Housing in the Last Year: Not on file       Current Medications   Current Medications    piperacillin-tazobactam  4,500 mg IntraVENous Q8H    sodium chloride flush        dextrose         dextrose bolus **OR** dextrose bolus, potassium chloride, magnesium sulfate, sodium phosphate IVPB **OR** sodium phosphate IVPB **OR** sodium phosphate IVPB, dextrose 5 % and 0.45 % NaCl  IV Drips/Infusions   dextrose 5 % and 0.45 % NaCl 150 mL/hr at 06/24/22 4081    sodium chloride      insulin Stopped (06/24/22 0214)     Home Medications  Medications Prior to Admission: CPAP Machine MISC, by Does not apply route  nebivolol (BYSTOLIC) 5 MG tablet, Take 5 mg by mouth daily  rivaroxaban (XARELTO) 20 MG TABS tablet, Take 20 mg by mouth  pitavastatin (LIVALO) 4 MG TABS tablet, Take by mouth nightly  dapagliflozin (FARXIGA) 5 MG tablet, Take 5 mg by mouth every morning    Diet/Nutrition   Diet NPO    Allergies   Patient has no known allergies. Social History   Tobacco   reports that he has never smoked. He has never used smokeless tobacco.    Alcohol     reports current alcohol use. Occupational history :    Family History         Problem Relation Age of Onset    Diabetes Mother     Heart Disease Mother     Other Father         glaucoma     Cancer Father         prostate     No Known Problems Brother     No Known Problems Brother        Sleep History   Hx of JOSEFINA noncompliant with CPAP    ROS     REVIEW OF SYSTEMS:  Review of Systems   Constitutional: Positive for appetite change. Negative for fatigue and fever. HENT: Negative for sore throat and trouble swallowing. Respiratory: Positive for cough. Negative for shortness of breath and wheezing. Cardiovascular: Negative for chest pain, palpitations and leg swelling. Gastrointestinal: Positive for abdominal pain and constipation. Negative for diarrhea, nausea and vomiting. Genitourinary: Negative for difficulty urinating and dysuria. Musculoskeletal: Negative for arthralgias and myalgias. Skin: Negative for color change and rash. Neurological: Negative for dizziness and headaches. Psychiatric/Behavioral: Negative for confusion and decreased concentration. Lines and Devices   None    Mechanical Ventilation Data   VENT SETTINGS (Comprehensive)     Additional Respiratory Assessments  Heart Rate: (!) 109  Resp: (!) 53  SpO2: 96 %    ABG  No results found for: PH, PCO2, PO2, HCO3, O2SAT  No results found for: IFIO2, MODE, SETTIDVOL, SETPEEP      Vitals    height is 6' 2.5\" (1.892 m) and weight is 242 lb 14.4 oz (110.2 kg). His oral temperature is 98.7 °F (37.1 °C). His blood pressure is 115/68 and his pulse is 109 (abnormal). His respiration is 53 (abnormal) and oxygen saturation is 96%.        Temperature Range: Temp: 98.7 °F (37.1 °C) Temp  Av.6 °F (37 °C)  Min: 98.4 °F (36.9 °C)  Max: 98.7 °F (37.1 °C)  BP Range:  Systolic (85ZMW), JFP:535 , Min:91 , JSA:861     Diastolic (42YJI), THERESA:54, Min:65, Max:76    Pulse Range: Pulse  Av.2  Min: 89  Max: 109  Respiration Range: Resp  Av  Min: 14  Max: 95  Current Pulse Ox[de-identified]  SpO2: 96 %  24HR Pulse Ox Range:  SpO2  Av.4 %  Min: 95 %  Max: 98 %  Oxygen Amount and Delivery:        I/O (24 Hours)    Patient Vitals for the past 8 hrs:   BP Temp Temp src Pulse Resp SpO2 Height Weight   22 0600 115/68 -- -- (!) 109 (!) 53 96 % -- --   22 0500  -- -- (!) 106 18 98 % -- --   22 0400  -- -- (!) 105 22 97 % -- --   22 0300 114/76 -- -- 97 17 96 % -- --   22 0214 114/76 -- -- 89 18 -- -- --   22 0200 114/76 -- -- (!) 102 18 96 % -- --   22 0130 -- -- -- 97 -- -- -- --   22 0120 114/ -- -- 98 -- -- -- --   22 0117 114 98.7 °F (37.1 °C) Oral 91 19 96 % 6' 2.5\" (1.892 m) 242 lb 14.4 oz (110.2 kg)   22 0057 96/65 -- -- 92 14 95 % -- --   22 2340 111/72 -- -- (!) 103 (!) 95 96 % -- --       Intake/Output Summary (Last 24 hours) at 2022 0854  Last data filed at 6/24/2022 0629  Gross per 24 hour   Intake 1442.74 ml   Output 450 ml   Net 992.74 ml     No intake/output data recorded. Date 06/24/22 0000 - 06/24/22 2359   Shift 2238-2230 5554-2199 5079-6923 24 Hour Total   INTAKE   I.V.(mL/kg) 1056. 6(9.6)   1056. 6(9.6)   IV Piggyback(mL/kg) 386.2(3.5)   386.2(3.5)   Shift Total(mL/kg) 1442. 7(13.1)   1442. 7(13.1)   OUTPUT   Urine(mL/kg/hr) 450   450   Shift Total(mL/kg) 450(4.1)   450(4.1)   Weight (kg) 110.2 110.2 110.2 110.2     Patient Vitals for the past 96 hrs (Last 3 readings):   Weight   06/24/22 0117 242 lb 14.4 oz (110.2 kg)   06/23/22 1753 238 lb (108 kg)         Drains/Tubes Outputs    Exam       PHYSICAL EXAM:  CONSTITUTIONAL:  awake, alert, cooperative, no apparent distress, and appears stated age  EYES:  Lids and lashes normal, pupils equal, round and reactive to light, extra ocular muscles intact, sclera clear, conjunctiva normal  LUNGS:  No increased work of breathing, good air exchange, clear to auscultation bilaterally, no crackles or wheezing  CARDIOVASCULAR:  Normal apical impulse, regular rate and rhythm, normal S1 and S2, no S3 or S4, and no murmur noted. Tachycardia  ABDOMEN:  No scars, normal bowel sounds, soft, mild distention, lower abdominal and periumbilical tenderness, umbilical hernia is present, no masses palpated, no hepatosplenomegally  MUSCULOSKELETAL:  there is no redness, warmth, or swelling of the joints  NEUROLOGIC:  Awake, alert, oriented to name, place and time. Cranial nerves II-XII are grossly intact.    SKIN:  no bruising or bleeding, normal skin color, texture, turgor, no redness, warmth, or swelling and no rashes      Data   Old records and images have been reviewed    Lab Results   CBC     Lab Results   Component Value Date    WBC 19.0 06/23/2022    RBC 4.97 06/23/2022    HGB 15.0 06/23/2022    HCT 45.5 06/23/2022     06/23/2022    MCV 91.5 06/23/2022    MCH 30.2 06/23/2022    MCHC 33.0 06/23/2022    RDW 13.0 06/23/2022 LYMPHOPCT 7.4 06/23/2022    MONOPCT 5.8 06/23/2022    BASOPCT 0.2 06/23/2022    MONOSABS 1.10 06/23/2022    LYMPHSABS 1.40 06/23/2022    EOSABS 0.01 06/23/2022    BASOSABS 0.04 06/23/2022       BMP   Lab Results   Component Value Date     06/24/2022    K 3.4 06/24/2022     06/24/2022    CO2 14 06/24/2022    BUN 17 06/24/2022    CREATININE 0.9 06/24/2022    GLUCOSE 82 06/24/2022    CALCIUM 7.6 06/24/2022       LFTS  Lab Results   Component Value Date    ALKPHOS 52 06/23/2022    ALT 11 06/23/2022    AST 14 06/23/2022    PROT 7.7 06/23/2022    BILITOT 2.1 06/23/2022    LABALBU 4.5 06/23/2022       INR  No results for input(s): PROTIME, INR in the last 72 hours. APTT  No results for input(s): APTT in the last 72 hours. Lactic Acid  Lab Results   Component Value Date    LACTA 0.7 06/23/2022    LACTA 1.1 06/23/2022        BNP   No results for input(s): BNP in the last 72 hours. Cultures     No results for input(s): BC in the last 72 hours. No results for input(s): Latrice Hepler in the last 72 hours. No results for input(s): LABURIN in the last 72 hours. Radiology   CXR  None      CT Scans  CT Abd-Pelvis 6/23  1. Acute SIGMOID DIVERTICULITIS with evidence of MICROPERFORATION (small   locules of gas in the pericolic fat).  No abscess. 2. Liquid stool in the colon indicating diarrheal illness. 3. Small fat containing umbilical hernia.  Small amount of fluid in the   herniated fat raises the possibility of FAT STRANGULATION.  Clinical   correlation is needed. 4. Small shallow fat containing right inguinal hernia.  No evidence of fat   strangulation. SYSTEMS ASSESSMENT    Neuro   - AAOx3    Respiratory   - Hx of JOSEFINA noncompliant with CPAP  - On room air    Cardiovascular   - PAF.  Hx of TANA guided DC cardioversion 2017  - Resume home Nebivilol interchanged to Toprol XL 50 mg QD    Gastrointestinal   - Diverticulitis with microperforation  - Surgery following- no current plan for intervention, but plan to hold Xarelto in case need for procedure soon  - Constipation on presentation   - Bowel regimen: Colace, Senna, Miralax    Renal   - No acute problems     Infectious Disease   - Zosyn 4.5 g Q8H for management of diverticulitis  - Blood cx x2 pending  - Respiratory panel ordered    Hematology/Oncology   - Chronic anticoagulation with Xarelto for PAF  - Hold Xarelto for possible surgical intervention  - Therapeutic Lovenox given hx of PAF    Endocrine   - Euglycemic DKA due to use of SGLT-2 inhibitor use  - Type 2 DM: home regimen 7.5-1000 mg Segluromet QD (Ertuglifozin, Metformin)  - A1c 6.8  -  cc/hr  - MDSSI  - Repeat BMP  - If continues to be acidotic, plan for PO Bicarb      Social/Spiritual/DNR/Other   Code status: Full  Diet: Carb control diet   Stress ulcer prophylaxis: Protonix   DVT prophylaxis: Therapeutic Lovenox  Consultations needed: Yes  Transfer out of ICU today? No      Herbert Thomason MD Resident PGY-1    \"Thank you for asking us to see this complex patient. \"        I personally saw, examined and provided care for the patient. Radiographs, labs and medication list were reviewed by me independently. I spoke with bedside nursing, therapists and consultants. Critical care services and times documented are independent of procedures and multidisciplinary rounds with Residents. Additionally comprehensive, multidisciplinary rounds were conducted with the MICU team. The case was discussed in detail and plans for care were established. Review of Residents documentation was conducted and revisions were made as appropriate. I agree with the above documented exam, problem list and plan of care.   Andree Grigsby MD   CCT excluding procedures:38'

## 2022-06-24 NOTE — PLAN OF CARE
Problem: Discharge Planning  Goal: Discharge to home or other facility with appropriate resources  Outcome: Progressing  Flowsheets (Taken 6/24/2022 0130)  Discharge to home or other facility with appropriate resources: Identify discharge learning needs (meds, wound care, etc)     Problem: Pain  Goal: Verbalizes/displays adequate comfort level or baseline comfort level  Outcome: Progressing

## 2022-06-24 NOTE — PLAN OF CARE
Problem: Discharge Planning  Goal: Discharge to home or other facility with appropriate resources  6/24/2022 0643 by Nba Solano RN  Outcome: Progressing  6/24/2022 0341 by Nba Solano RN  Outcome: Progressing  Flowsheets (Taken 6/24/2022 0130)  Discharge to home or other facility with appropriate resources: Identify discharge learning needs (meds, wound care, etc)     Problem: Pain  Goal: Verbalizes/displays adequate comfort level or baseline comfort level  6/24/2022 0643 by Nba Solano RN  Outcome: Progressing  6/24/2022 0341 by Nba Solano RN  Outcome: Progressing

## 2022-06-24 NOTE — PROGRESS NOTES
Nurse to nurse called to [place]. All questions answered. Patient stable and ready for transfer, transport request placed.

## 2022-06-24 NOTE — ED PROVIDER NOTES
This is a 50-year-old male with a past medical history of atrial fibrillation and diabetes mellitus who presents to the ED for evaluation of abdominal pain. Patient states that for the past 3 days he has been having pain to his periumbilical and bilateral lower abdomen. He states the pain seems to be getting more constant and going more inferior to where it started. Patient remarks that he has been constipated and has tried taking enemas without much relief. He states he has been still able to drink but cannot eat anything and has no appetite. No reported fevers or chills or flank pain. No previous abdominal surgeries. Patient states he went to his primary care doctor today advised him to come to the ER for further evaluation. No other reported mitigating or exacerbating factors. The history is provided by the patient. Review of Systems   Constitutional: Positive for appetite change. Negative for fever. HENT: Negative for congestion. Eyes: Negative for visual disturbance. Respiratory: Negative for cough and shortness of breath. Cardiovascular: Negative for chest pain. Gastrointestinal: Negative for abdominal distention. Endocrine: Negative for polyuria. Genitourinary: Negative for dysuria. Musculoskeletal: Negative for back pain. Skin: Negative for rash. Allergic/Immunologic: Negative for immunocompromised state. Neurological: Negative for headaches. Hematological: Does not bruise/bleed easily. Psychiatric/Behavioral: Negative for confusion. Physical Exam  Vitals and nursing note reviewed. Constitutional:       General: He is not in acute distress. Appearance: He is well-developed. HENT:      Head: Normocephalic and atraumatic. Mouth/Throat:      Mouth: Mucous membranes are dry. Eyes:      Extraocular Movements: Extraocular movements intact. Neck:      Vascular: No JVD. Cardiovascular:      Rate and Rhythm: Regular rhythm. Tachycardia present. Heart sounds: No murmur heard. Pulmonary:      Effort: Pulmonary effort is normal.      Breath sounds: No wheezing, rhonchi or rales. Chest:      Chest wall: No tenderness. Abdominal:      General: There is no distension. Palpations: Abdomen is soft. Tenderness: There is no guarding or rebound. Hernia: No hernia is present. Comments: Lower abdominal tenderness to palpation   Musculoskeletal:      Cervical back: Normal range of motion and neck supple. Right lower leg: No edema. Left lower leg: No edema. Skin:     General: Skin is warm and dry. Capillary Refill: Capillary refill takes less than 2 seconds. Neurological:      General: No focal deficit present. Mental Status: He is alert and oriented to person, place, and time. Cranial Nerves: No cranial nerve deficit. Psychiatric:         Mood and Affect: Mood normal.         Behavior: Behavior normal.          Procedures     MDM  Number of Diagnoses or Management Options  Diabetic ketoacidosis without coma associated with other specified diabetes mellitus ([facility] [address]  Diverticulitis of colon  Diagnosis management comments: This is a pleasant 58-year-old male with a past medical history of A. fib and diabetes who presented to the ED for about 2 to 3 days worth of lower abdominal pain constipation. Patient was found to have an anion gap that was elevated as well as a low bicarb blood sugar was stable however he is on an SGL 2 and found to be in likely euglycemic DKA. Additionally patient was found to have a diverticulitis with microperforation patient was treated with IV fluids per the request of family he wanted to see Dr. Aniceto River. I spoke with Dr. Mary Borja who agreed to accept patient.  Patient and wife were agreeable with plan       ED Course as of 06/24/22 0234   Thu Jun 23, 2022   [id number] Spoke with Dr. Mary Borja, agreed to accept patient in ICU recommended starting patient on DKA protocol and infusion [BP]      ED Course User Index  [BP] Lior Gayle DO        ED Course as of 06/24/22 0234   Thu Jun 23, 2022   4260 Spoke with Dr. Neo Webster, agreed to accept patient in ICU recommended starting patient on DKA protocol and infusion [BP]      ED Course User Index  [BP] Lior Gayle DO       --------------------------------------------- PAST HISTORY ---------------------------------------------  Past Medical History:  has a past medical history of Diabetes mellitus ([facility] [address] Hyperlipidemia, Paroxysmal A-fib ([facility] [address] and Sleep apnea. Past Surgical History:  has a past surgical history that includes Colonoscopy and Cardioversion (2017). Social History:  reports that he has never smoked. He has never used smokeless tobacco. He reports current alcohol use. He reports that he does not use drugs. Family History: family history includes Cancer in his father; Diabetes in his mother; Heart Disease in his mother; No Known Problems in his brother and brother; Other in his father. The patients home medications have been reviewed. Allergies: Patient has no known allergies.     -------------------------------------------------- RESULTS -------------------------------------------------    LABS:  Results for orders placed or performed during the hospital encounter of 06/23/22   CBC with Auto Differential   Result Value Ref Range    WBC 19.0 (H) 4.5 - 11.5 E9/L    RBC 4.97 3.80 - 5.80 E12/L    Hemoglobin 15.0 12.5 - 16.5 g/dL    Hematocrit 45.5 37.0 - 54.0 %    MCV 91.5 80.0 - 99.9 fL    MCH 30.2 26.0 - 35.0 pg    MCHC 33.0 32.0 - 34.5 %    RDW 13.0 11.5 - 15.0 fL    Platelets 534 309 - 511 E9/L    MPV 9.8 7.0 - 12.0 fL    Neutrophils % 85.8 (H) 43.0 - 80.0 %    Immature Granulocytes % 0.7 0.0 - 5.0 %    Lymphocytes % 7.4 (L) 20.0 - 42.0 %    Monocytes % 5.8 2.0 - 12.0 %    Eosinophils % 0.1 0.0 - 6.0 %    Basophils % 0.2 0.0 - 2.0 %    Neutrophils Absolute 16.28 (H) 1.80 - 7.30 E9/L    Immature Granulocytes # 0.14 E9/L Lymphocytes Absolute 1.40 (L) 1.50 - 4.00 E9/L    Monocytes Absolute 1.10 (H) 0.10 - 0.95 E9/L    Eosinophils Absolute 0.01 (L) 0.05 - 0.50 E9/L    Basophils Absolute 0.04 0.00 - 0.20 E9/L   Comprehensive Metabolic Panel   Result Value Ref Range    Sodium 136 132 - 146 mmol/L    Potassium 3.9 3.5 - 5.0 mmol/L    Chloride 98 98 - 107 mmol/L    CO2 13 (L) 22 - 29 mmol/L    Anion Gap 25 (H) 7 - 16 mmol/L    Glucose 103 (H) 74 - 99 mg/dL    BUN 17 6 - 20 mg/dL    CREATININE 1.0 0.7 - 1.2 mg/dL    GFR Non-African American >60 >=60 mL/min/1.73    GFR African American >60     Calcium 9.5 8.6 - 10.2 mg/dL    Total Protein 7.7 6.4 - 8.3 g/dL    Albumin 4.5 3.5 - 5.2 g/dL    Total Bilirubin 2.1 (H) 0.0 - 1.2 mg/dL    Alkaline Phosphatase 52 40 - 129 U/L    ALT 11 0 - 40 U/L    AST 14 0 - 39 U/L   Lipase   Result Value Ref Range    Lipase 15 13 - 60 U/L   Lactic Acid   Result Value Ref Range    Lactic Acid 1.1 0.5 - 2.2 mmol/L   Lactic Acid   Result Value Ref Range    Lactic Acid 0.7 0.5 - 2.2 mmol/L   Urinalysis   Result Value Ref Range    Color, UA Yellow Straw/Yellow    Clarity, UA Clear Clear    Glucose, Ur 500 (A) Negative mg/dL    Bilirubin Urine MODERATE (A) Negative    Ketones, Urine >=80 (A) Negative mg/dL    Specific Gravity, UA >=1.030 1.005 - 1.030    Blood, Urine TRACE-LYSED Negative    pH, UA 5.5 5.0 - 9.0    Protein,  (A) Negative mg/dL    Urobilinogen, Urine 0.2 <2.0 E.U./dL    Nitrite, Urine Negative Negative    Leukocyte Esterase, Urine Negative Negative   Microscopic Urinalysis   Result Value Ref Range    WBC, UA NONE 0 - 5 /HPF    RBC, UA 0-1 0 - 2 /HPF    Bacteria, UA NONE SEEN None Seen /HPF   pH, venous   Result Value Ref Range    pH, Jayce 7.30 (L) 7.35 - 7.45   Beta-Hydroxybutyrate   Result Value Ref Range    Beta-Hydroxybutyrate >4.50 (H) 0.02 - 0.27 mmol/L   POCT Glucose - every hour   Result Value Ref Range    Glucose 84 mg/dL   POCT Glucose - every hour   Result Value Ref Range    Glucose 99 mg/dL   POCT Glucose   Result Value Ref Range    Meter Glucose 84 74 - 99 mg/dL   POCT Glucose   Result Value Ref Range    Meter Glucose 99 74 - 99 mg/dL   POCT Glucose   Result Value Ref Range    Meter Glucose 74 74 - 99 mg/dL       RADIOLOGY:  CT ABDOMEN PELVIS W IV CONTRAST Additional Contrast? None   Final Result   1. Acute SIGMOID DIVERTICULITIS with evidence of MICROPERFORATION (small   locules of gas in the pericolic fat). No abscess. 2. Liquid stool in the colon indicating diarrheal illness. 3. Small fat containing umbilical hernia. Small amount of fluid in the   herniated fat raises the possibility of FAT STRANGULATION. Clinical   correlation is needed. 4. Small shallow fat containing right inguinal hernia. No evidence of fat   strangulation. RECOMMENDATIONS:   Unavailable                 ------------------------- NURSING NOTES AND VITALS REVIEWED ---------------------------  Date / Time Roomed:  6/23/2022  8:05 PM  ED Bed Assignment:  [id number]    The nursing notes within the ED encounter and vital signs as below have been reviewed.      Patient Vitals for the past 24 hrs:   BP Temp Temp src Pulse Resp SpO2 Height Weight   06/24/22 0214 114/76 -- -- 89 18 -- -- --   06/24/22 0200 114/76 -- -- (!) 102 18 96 % -- --   06/24/22 0130 -- -- -- 97 -- -- -- --   06/24/22 0120 114/76 -- -- 98 -- -- -- --   06/24/22 0117 114/76 98.7 °F (37.1 °C) Oral 91 19 96 % 6' 2.5\" (1.892 m) 242 lb 14.4 oz (110.2 kg)   06/24/22 0057 96/65 -- -- 92 14 95 % -- --   06/23/22 2340 111/72 -- -- (!) 103 (!) 95 96 % -- --   06/23/22 2211 112/71 -- -- (!) 103 16 96 % -- --   06/23/22 1753 128/74 98.4 °F (36.9 °C) Temporal 98 18 98 % 6' 2.5\" (1.892 m) 238 lb (108 kg)       Oxygen Saturation Interpretation: Normal    ------------------------------------------ PROGRESS NOTES ------------------------------------------  Re-evaluation(s):  Time: 2211  Patients symptoms are improving  Repeat physical examination is improved    Counseling:  I have spoken with the patient and discussed todays results, in addition to providing specific details for the plan of care and counseling regarding the diagnosis and prognosis. Their questions are answered at this time and they are agreeable with the plan of admission.    --------------------------------- ADDITIONAL PROVIDER NOTES ---------------------------------  Consultations:  Spoke with April  Discussed case. They will admit the patient. This patient's ED course included: a personal history and physicial examination, re-evaluation prior to disposition, multiple bedside re-evaluations, IV medications, cardiac monitoring, continuous pulse oximetry and complex medical decision making and emergency management    This patient has remained hemodynamically stable during their ED course. Please note that the withdrawal or failure to initiate urgent interventions for this patient would likely result in a life threatening deterioration or permanent disability. Systems at risk for deterioration include: Cv/Pulm    Accordingly this patient received 30 minutes of critical care time, excluding separately billable procedures. Diagnosis:  1. Diverticulitis of colon    2.  Diabetic ketoacidosis without coma associated with other specified diabetes mellitus ([facility] [address]        Disposition:  Patient's disposition: Admit to CCU/ICU  Patient's condition is fair        Sherlyn Mann DO  06/24/22 0234

## 2022-06-24 NOTE — PATIENT CARE CONFERENCE
..  Intensive Care Daily Quality Rounding Checklist      ICU Team Members: N/A    ICU Day #: NUMBER: 2    Intubation Date:  N/A    Ventilator Day #: N/A    Central Line Insertion Date: N/A        Day #: N/A     Arterial Line Insertion Date: N/A      Day #: N/A    Temporary Hemodialysis Catheter Insertion Date: N/A        Day # N/A    DVT Prophylaxis: Lovenox    GI Prophylaxis: start PO diet    Pinto Catheter Insertion Date: N/A       Day #: N/A      Continued need (if yes, reason documented and discussed with physician): n/a-- pt voids per urinal    Skin Issues/ Wounds and ordered treatment discussed on rounds: no issues    Goals/ Plans for the Day: Daily labs, continue DKA protocol, will stop Insulin drip and IVF and start PO diet

## 2022-06-24 NOTE — H&P
tablet, Take 5 mg by mouth daily  rivaroxaban (XARELTO) 20 MG TABS tablet, Take 20 mg by mouth  [DISCONTINUED] pitavastatin (LIVALO) 4 MG TABS tablet, Take by mouth nightly (Patient not taking: Reported on 6/24/2022)  [DISCONTINUED] dapagliflozin (FARXIGA) 5 MG tablet, Take 5 mg by mouth every morning    Note that the patient's home medications were reviewed and the above list is accurate to the best of my knowledge at the time of the exam.    Allergies:    Patient has no known allergies. Social History:    reports that he has never smoked. He has never used smokeless tobacco. He reports current alcohol use. He reports that he does not use drugs. Family History:   family history includes Cancer in his father; Diabetes in his mother; Heart Disease in his mother; No Known Problems in his brother and brother; Other in his father. PHYSICAL EXAM:    Vitals:  /80   Pulse (!) 101   Temp 98.3 °F (36.8 °C) (Infrared)   Resp 10   Ht 6' 2.5\" (1.892 m)   Wt 242 lb 14.4 oz (110.2 kg)   SpO2 95%   BMI 30.77 kg/m²       General appearance: NAD, conversant  Eyes: Sclerae anicteric, PERRLA  HEENT: AT/NC, MMM  Neck: FROM, supple, no thyromegaly  Lymph: No cervical / supraclavicular lymphadenopathy  Lungs: Clear to auscultation, WOB normal  CV: RRR, no MRGs, no lower extremity edema  Abdomen: Soft, non-tender; no masses or HSM, +BS, tender to palpation in lower abdomen diffusely  Extremities: FROM without synovitis. No clubbing or cyanosis of the hands. Skin: no rash, induration, lesions, or ulcers  Psych: Calm and cooperative. Normal judgement and insight. Normal mood and affect. Neuro: Alert and interactive, face symmetric, speech fluent. LABS:  All labs reviewed.   Of note:  CBC:   Lab Results   Component Value Date    WBC 13.3 06/24/2022    RBC 4.09 06/24/2022    HGB 12.2 06/24/2022    HCT 36.3 06/24/2022    MCV 88.8 06/24/2022    MCH 29.8 06/24/2022    MCHC 33.6 06/24/2022    RDW 12.9 06/24/2022  06/24/2022    MPV 9.9 06/24/2022     CMP:    Lab Results   Component Value Date     06/24/2022    K 4.0 06/24/2022     06/24/2022    CO2 15 06/24/2022    BUN 12 06/24/2022    CREATININE 0.8 06/24/2022    GFRAA >60 06/24/2022    LABGLOM >60 06/24/2022    GLUCOSE 160 06/24/2022    PROT 7.7 06/23/2022    LABALBU 4.5 06/23/2022    CALCIUM 7.9 06/24/2022    BILITOT 2.1 06/23/2022    ALKPHOS 52 06/23/2022    AST 14 06/23/2022    ALT 11 06/23/2022       Imaging:  CT abdomen: Acute sigmoid diverticulitis with evidence of microperforation. No abscess. Liquid stool in the colon indicating diarrheal illness. Small fat-containing umbilical hernia. Small amount of fluid in the herniated fat raises approximately 5 strangulation. EKG:  Not obtained    Telemetry:  I've personally reviewed the patient's telemetry:  Normal sinus    ASSESSMENT/PLAN:  Principal Problem:    Diabetes mellitus with ketoacidosis, controlled ([address]  Active Problems:    JOSEFINA (obstructive sleep apnea)    Diverticulitis    Paroxysmal atrial fibrillation (HCC)  Resolved Problems:    * No resolved hospital problems.  *    Patient is a 67-year-old male admitted to ICU for  DKA  -Monitor labs  -Beta hydroxy >4.50  -Insulin gtt > discontinued, start metformin 1000 p.o. twice daily-renal function is normal  -Current hemoglobin A1c of 6.8 does not warrant immediate initiation of insulin-would continue to monitor on metformin  -ISS glucose control  -Hypoglycemia protocol initiated  -DKA likely from stress state and patient with acute diverticulitis/perforation-doubt medication induced    Diverticulitis with microperforation  -Monitor labs, CBC daily  -IV Zosyn-transition to p.o. antibiotic  -IVF [email or web address]  -Pain/nausea control PRN  -General Surgery following    PAF  -Continue home medications  -Rate control and oral anticoagulation    Resume all other home medications including oral anticoagulation if okay with general surgery, if no plans for surgical intervention      DVT prophylaxis   PT OT  Discharge planning        Code status: Full  Requires inpatient level of care    Royal Sae MD

## 2022-06-24 NOTE — CONSULTS
GENERAL SURGERY  CONSULT NOTE  6/24/2022    Physician Consulted: Dr. Juan Bryson  Reason for Consult: diverticulitis with microperforation  Referring Physician: Dr. Lyndsay Pineda    GILMA Joseph is a 61 y.o. male who presents to the general surgery service for evaluation of abdominal pain. The patient reports 3 days of worsening lower abdominal pain. He has had associated constipation, decreased appetite, and chills. No fever. He continues to pass flatus. He has never experienced these symptoms before. The patient presented to the ED yesterday for these symptoms, and was found to be in euglycemic DKA. He was admitted to the ICU    Medical history is significant for DM and A fib. No prior abdominal surgical history. The patient is taking Xarelto as a home medication. Since admission, the patient has been initiated on Zosyn. Abdominal pain has decreased in severity from 9/10 to 6/10. Past Medical History:   Diagnosis Date    Diabetes mellitus ([address]     Hyperlipidemia     Paroxysmal A-fib ([facility] [address]     Sleep apnea        Past Surgical History:   Procedure Laterality Date    CARDIOVERSION  2017    COLONOSCOPY         Medications Prior to Admission:    Prior to Admission medications    Medication Sig Start Date End Date Taking?  Authorizing Provider   CPAP Machine MISC by Does not apply route    Historical Provider, MD   nebivolol (BYSTOLIC) 5 MG tablet Take 5 mg by mouth daily    Historical Provider, MD   rivaroxaban (XARELTO) 20 MG TABS tablet Take 20 mg by mouth    Historical Provider, MD   pitavastatin (LIVALO) 4 MG TABS tablet Take by mouth nightly    Historical Provider, MD   dapagliflozin (FARXIGA) 5 MG tablet Take 5 mg by mouth every morning    Historical Provider, MD       No Known Allergies    Family History   Problem Relation Age of Onset    Diabetes Mother     Heart Disease Mother     Other Father         glaucoma     Cancer Father         prostate     No Known Problems Brother     No Known Problems Brother        Social History     Tobacco Use    Smoking status: Never Smoker    Smokeless tobacco: Never Used   Substance Use Topics    Alcohol use: Yes    Drug use: No         Review of Systems   Constitutional: Positive for activity change, appetite change and chills. Negative for fatigue and fever. HENT: Negative for facial swelling and trouble swallowing. Respiratory: Positive for shortness of breath. Negative for cough. Cardiovascular: Negative for chest pain and palpitations. Gastrointestinal: Positive for abdominal distention, abdominal pain, constipation and nausea. Negative for diarrhea and vomiting. Endocrine: Negative for polydipsia and polyphagia. Genitourinary: Negative for difficulty urinating and dysuria. Skin: Negative for color change and rash. Neurological: Positive for weakness. Negative for dizziness. Psychiatric/Behavioral: Negative for agitation, behavioral problems and confusion. PHYSICAL EXAM:    Vitals:    06/24/22 0600   BP: 115/68   Pulse: (!) 109   Resp: (!) 53   Temp:    SpO2: 96%       General Appearance:  awake, alert, oriented  Skin:  Skin color, texture, turgor normal.   Head/face:  NCAT  Eyes:  No gross abnormalities. Sclera nonicteric  Lungs/Chest:  Normal expansion. No respiratory distress. On room air. No chest wall tenderness  Heart: Warm throughout. Irregularly irregular rhythm, borderline tachycardic  Abdomen:  Soft, mild tenderness in the lower abdomen, obese. Extremities: Extremities warm to touch, pink        LABS:    CBC  Recent Labs     06/23/22 2041   WBC 19.0*   HGB 15.0   HCT 45.5        BMP  Recent Labs     06/24/22  0317      K 3.4*      CO2 14*   BUN 17   CREATININE 0.9   CALCIUM 7.6*     Liver Function  Recent Labs     06/23/22 2041   LIPASE 15   BILITOT 2.1*   AST 14   ALT 11   ALKPHOS 52   PROT 7.7   LABALBU 4.5     No results for input(s): LACTATE in the last 72 hours.   No results for input(s): INR, PTT in the last 72 hours. Invalid input(s): PT    RADIOLOGY    I have personally reviewed all relevant labs and imaging. Leukocytosis of 19  CT abdomen/pelvis showing sigmoid diverticulitis with microperforation. Fat containing umbilical hernia      ASSESSMENT:  61 y.o. male with acute diverticulitis with microperforation    PLAN:   IVF   Antibiotics   Daily CBC  pain/nausea control PRN   no acute surgical intervention planned at this time.  Outpatient colonoscopy following discharge    Electronically signed by Nakul Tim DO on 6/24/22 at 6:45 AM EDT     The patient was seen and examined and the chart was reviewed. I agree with the assessment and plan. The patient had a microperforation with local peritonitis. Presently, the patient is receiving IV antibiotics. The patient's clinical picture and laboratory studies will be followed. Ultimately, the patient will undergo a colonoscopy 6 weeks from the date of his admission.

## 2022-06-25 LAB
ANION GAP SERPL CALCULATED.3IONS-SCNC: 12 MMOL/L (ref 7–16)
BASOPHILS ABSOLUTE: 0.01 E9/L (ref 0–0.2)
BASOPHILS RELATIVE PERCENT: 0.1 % (ref 0–2)
BUN BLDV-MCNC: 10 MG/DL (ref 6–20)
CALCIUM SERPL-MCNC: 8.3 MG/DL (ref 8.6–10.2)
CHLORIDE BLD-SCNC: 106 MMOL/L (ref 98–107)
CO2: 19 MMOL/L (ref 22–29)
CREAT SERPL-MCNC: 0.8 MG/DL (ref 0.7–1.2)
EOSINOPHILS ABSOLUTE: 0.14 E9/L (ref 0.05–0.5)
EOSINOPHILS RELATIVE PERCENT: 1.4 % (ref 0–6)
GFR AFRICAN AMERICAN: >60
GFR NON-AFRICAN AMERICAN: >60 ML/MIN/1.73
GLUCOSE BLD-MCNC: 136 MG/DL (ref 74–99)
HCT VFR BLD CALC: 37.2 % (ref 37–54)
HEMOGLOBIN: 12.7 G/DL (ref 12.5–16.5)
IMMATURE GRANULOCYTES #: 0.07 E9/L
IMMATURE GRANULOCYTES %: 0.7 % (ref 0–5)
LYMPHOCYTES ABSOLUTE: 1 E9/L (ref 1.5–4)
LYMPHOCYTES RELATIVE PERCENT: 9.9 % (ref 20–42)
MAGNESIUM: 2.2 MG/DL (ref 1.6–2.6)
MCH RBC QN AUTO: 29.7 PG (ref 26–35)
MCHC RBC AUTO-ENTMCNC: 34.1 % (ref 32–34.5)
MCV RBC AUTO: 87.1 FL (ref 80–99.9)
METER GLUCOSE: 110 MG/DL (ref 74–99)
METER GLUCOSE: 133 MG/DL (ref 74–99)
METER GLUCOSE: 134 MG/DL (ref 74–99)
METER GLUCOSE: 142 MG/DL (ref 74–99)
MONOCYTES ABSOLUTE: 0.71 E9/L (ref 0.1–0.95)
MONOCYTES RELATIVE PERCENT: 7 % (ref 2–12)
MRSA CULTURE ONLY: NORMAL
NEUTROPHILS ABSOLUTE: 8.22 E9/L (ref 1.8–7.3)
NEUTROPHILS RELATIVE PERCENT: 80.9 % (ref 43–80)
PDW BLD-RTO: 12.7 FL (ref 11.5–15)
PHOSPHORUS: 1.5 MG/DL (ref 2.5–4.5)
PLATELET # BLD: 183 E9/L (ref 130–450)
PMV BLD AUTO: 9.8 FL (ref 7–12)
POTASSIUM SERPL-SCNC: 3.6 MMOL/L (ref 3.5–5)
RBC # BLD: 4.27 E12/L (ref 3.8–5.8)
SODIUM BLD-SCNC: 137 MMOL/L (ref 132–146)
WBC # BLD: 10.2 E9/L (ref 4.5–11.5)

## 2022-06-25 PROCEDURE — 82962 GLUCOSE BLOOD TEST: CPT

## 2022-06-25 PROCEDURE — 2580000003 HC RX 258: Performed by: INTERNAL MEDICINE

## 2022-06-25 PROCEDURE — 6360000002 HC RX W HCPCS: Performed by: SPECIALIST

## 2022-06-25 PROCEDURE — 85025 COMPLETE CBC W/AUTO DIFF WBC: CPT

## 2022-06-25 PROCEDURE — 83735 ASSAY OF MAGNESIUM: CPT

## 2022-06-25 PROCEDURE — 84100 ASSAY OF PHOSPHORUS: CPT

## 2022-06-25 PROCEDURE — 1200000000 HC SEMI PRIVATE

## 2022-06-25 PROCEDURE — 6360000002 HC RX W HCPCS: Performed by: STUDENT IN AN ORGANIZED HEALTH CARE EDUCATION/TRAINING PROGRAM

## 2022-06-25 PROCEDURE — 6360000002 HC RX W HCPCS: Performed by: INTERNAL MEDICINE

## 2022-06-25 PROCEDURE — 6370000000 HC RX 637 (ALT 250 FOR IP): Performed by: SPECIALIST

## 2022-06-25 PROCEDURE — 80048 BASIC METABOLIC PNL TOTAL CA: CPT

## 2022-06-25 PROCEDURE — 6370000000 HC RX 637 (ALT 250 FOR IP): Performed by: INTERNAL MEDICINE

## 2022-06-25 PROCEDURE — 2580000003 HC RX 258: Performed by: SPECIALIST

## 2022-06-25 PROCEDURE — 36415 COLL VENOUS BLD VENIPUNCTURE: CPT

## 2022-06-25 RX ORDER — FLUCONAZOLE 100 MG/1
200 TABLET ORAL DAILY
Status: DISCONTINUED | OUTPATIENT
Start: 2022-06-25 | End: 2022-06-27 | Stop reason: HOSPADM

## 2022-06-25 RX ADMIN — KETOROLAC TROMETHAMINE 30 MG: 30 INJECTION, SOLUTION INTRAMUSCULAR; INTRAVENOUS at 06:36

## 2022-06-25 RX ADMIN — SENNOSIDES 8.6 MG: 8.6 TABLET, FILM COATED ORAL at 22:18

## 2022-06-25 RX ADMIN — KETOROLAC TROMETHAMINE 30 MG: 30 INJECTION, SOLUTION INTRAMUSCULAR; INTRAVENOUS at 00:24

## 2022-06-25 RX ADMIN — KETOROLAC TROMETHAMINE 30 MG: 30 INJECTION, SOLUTION INTRAMUSCULAR; INTRAVENOUS at 18:51

## 2022-06-25 RX ADMIN — METOPROLOL SUCCINATE 50 MG: 50 TABLET, EXTENDED RELEASE ORAL at 10:48

## 2022-06-25 RX ADMIN — FLUCONAZOLE 200 MG: 100 TABLET ORAL at 17:34

## 2022-06-25 RX ADMIN — SODIUM CHLORIDE, POTASSIUM CHLORIDE, SODIUM LACTATE AND CALCIUM CHLORIDE: 600; 310; 30; 20 INJECTION, SOLUTION INTRAVENOUS at 02:30

## 2022-06-25 RX ADMIN — KETOROLAC TROMETHAMINE 30 MG: 30 INJECTION, SOLUTION INTRAMUSCULAR; INTRAVENOUS at 12:32

## 2022-06-25 RX ADMIN — PIPERACILLIN AND TAZOBACTAM 4500 MG: 4; .5 INJECTION, POWDER, LYOPHILIZED, FOR SOLUTION INTRAVENOUS at 06:42

## 2022-06-25 RX ADMIN — PIPERACILLIN AND TAZOBACTAM 4500 MG: 4; .5 INJECTION, POWDER, LYOPHILIZED, FOR SOLUTION INTRAVENOUS at 14:16

## 2022-06-25 RX ADMIN — DOCUSATE SODIUM 100 MG: 100 CAPSULE, LIQUID FILLED ORAL at 22:18

## 2022-06-25 RX ADMIN — INSULIN LISPRO 2 UNITS: 100 INJECTION, SOLUTION INTRAVENOUS; SUBCUTANEOUS at 16:44

## 2022-06-25 RX ADMIN — PIPERACILLIN AND TAZOBACTAM 4500 MG: 4; .5 INJECTION, POWDER, LYOPHILIZED, FOR SOLUTION INTRAVENOUS at 22:29

## 2022-06-25 RX ADMIN — ENOXAPARIN SODIUM 105 MG: 150 INJECTION SUBCUTANEOUS at 22:19

## 2022-06-25 RX ADMIN — ENOXAPARIN SODIUM 105 MG: 150 INJECTION SUBCUTANEOUS at 10:48

## 2022-06-25 ASSESSMENT — PAIN DESCRIPTION - FREQUENCY
FREQUENCY: INTERMITTENT
FREQUENCY: INTERMITTENT

## 2022-06-25 ASSESSMENT — PAIN SCALES - GENERAL
PAINLEVEL_OUTOF10: 4
PAINLEVEL_OUTOF10: 2
PAINLEVEL_OUTOF10: 3

## 2022-06-25 ASSESSMENT — PAIN DESCRIPTION - ORIENTATION
ORIENTATION: LOWER
ORIENTATION: RIGHT;LOWER
ORIENTATION: RIGHT;LOWER

## 2022-06-25 ASSESSMENT — PAIN DESCRIPTION - LOCATION
LOCATION: ABDOMEN

## 2022-06-25 ASSESSMENT — PAIN DESCRIPTION - PAIN TYPE
TYPE: ACUTE PAIN
TYPE: ACUTE PAIN

## 2022-06-25 ASSESSMENT — PAIN DESCRIPTION - DESCRIPTORS
DESCRIPTORS: DISCOMFORT
DESCRIPTORS: DISCOMFORT

## 2022-06-25 ASSESSMENT — PAIN DESCRIPTION - ONSET
ONSET: ON-GOING
ONSET: ON-GOING

## 2022-06-25 NOTE — PROGRESS NOTES
Progress note:    Overall, the patient is doing better. The patient states that he is tolerating a diet and moving his bowels. He states that his pain has improved. Vital signs:    Afebrile   Tachycardia 124  Normotensive    Laboratory studies    WBCs 10.2    Physical examination:    The abdomen is soft, slightly distended with mild lower quadrant tenderness without guarding or rebound.     Assessment:    Focally perforated sigmoid diverticulitis  Resolved DKA    Plan:    Low residue diet  Antibiotics per infectious disease    Yvette Ng MD

## 2022-06-25 NOTE — PROGRESS NOTES
[facility]   Progress note      Subjective: The patient is awake and alert. Out of icu   Feels well  No acute complaints   Abdominal pain feels better       Objective:    /79   Pulse (!) 108   Temp 98.4 °F (36.9 °C) (Oral)   Resp 16   Ht 6' 2.5\" (1.892 m)   Wt 242 lb 14.4 oz (110.2 kg)   SpO2 94%   BMI 30.77 kg/m²     In: 1478.9 [I.V.:1062.4]  Out: -   In: 1478.9   Out: -     General appearance: NAD, conversant  HEENT: AT/NC, MMM  Neck: FROM, supple  Lungs: Clear to auscultation  CV: RRR, no MRGs  Vasc: Radial pulses 2+  Abdomen: Soft, non-tender; no masses or HSM= minimal tenderness to palpation   Extremities: No peripheral edema or digital cyanosis  Skin: no rash, lesions or ulcers  Psych: Alert and oriented to person, place and time  Neuro: Alert and interactive     Recent Labs     06/23/22  2041 06/24/22  0635 06/25/22  0505   WBC 19.0* 13.3* 10.2   HGB 15.0 12.2* 12.7   HCT 45.5 36.3* 37.2    169 183       Recent Labs     06/24/22  0635 06/24/22  1113 06/25/22  0505    132 137   K 3.9 4.0 3.6    103 106   CO2 14* 15* 19*   BUN 14 12 10   CREATININE 0.9 0.8 0.8   CALCIUM 8.1* 7.9* 8.3*       Assessment:    Principal Problem:    Diabetes mellitus with ketoacidosis, controlled ([facility])  Active Problems:    JOSEFINA (obstructive sleep apnea)    Diverticulitis    Paroxysmal atrial fibrillation ([facility])  Resolved Problems:    * No resolved hospital problems.  *      Plan:  Patient is a 58-year-old male admitted to ICU for  DKA  -Monitor labs  -Beta hydroxy >4.50 on admission - now resolved   -Insulin gtt > discontinued, start metformin 1000 p.o. twice daily-renal function is normal  -Current hemoglobin A1c of 6.8 does not warrant immediate initiation of insulin-would continue to monitor on metformin- held today d/t contrast on Thursday - ok to get tonights dose   -ISS glucose control  -Hypoglycemia protocol initiated  -DKA likely from stress state and patient with acute diverticulitis/perforation-doubt medication induced     Diverticulitis with microperforation  -Monitor labs, CBC daily- normal today   -IV Zosyn-transition to p.o. antibiotic khadar w id now that wbc is normal   -IVF [email or web address]- transition to 75 then dc   -Pain/nausea control PRN  -General Surgery following     PAF chronically   -Continue home medications  -Rate control and oral anticoagulation  Ok to be off tele monitor     DVT Prophylaxis   PT/OT  Discharge planning - ok for dc from medicine tomorrow 6/26 if ok w others           Gurdeep Au MD  11:18 AM  6/25/2022

## 2022-06-25 NOTE — CONSULTS
[address] [place] [address] [facility]    Consultation Note     Admit Date: 6/23/2022  8:05 PM    Reason for Consult: Diverticulitis with perforation    Attending Physician:  Shannan Vallejo MD     Chief Complaint: Abdominal pain 3 days    HISTORY OF PRESENT ILLNESS:   The patient is a 61 y.o. man not known to the Infectious Diseases service. The patient is is admitted to the emergency room with 3 days of abdominal pain. Patient's pain is in the lower abdomen bilaterally. He states that he has been obstipated and enemas have not given him much relief. Not having much of an appetite although he is taken fluids in. He denies any fevers chills or flank pain. In the emergency room he had a white count of 19,000 BUN and creatinine 17 and 1.0. His beta hydroxybutyrate was elevated greater than 4.5. He was acidotic as well bicarb was 13. CT scan of the abdomen pelvis shows some microperforations and a loculated gas from acute diverticulitis. He is tolerating p.o. at this time and although he has a lot of suprapubic pain most of the diffuse abdominal pain has regressed somewhat. He has had a bowel movement which was liquidy and green.     Past Medical History:        Diagnosis Date    Diabetes mellitus ([address]     Hyperlipidemia     [facility]fib [facility]     Sleep apnea      Past Surgical History:        Procedure Laterality Date    CARDIOVERSION  2017    COLONOSCOPY       Current Medications:   Scheduled Meds:   metoprolol succinate  50 mg Oral Daily    insulin lispro  0-12 Units SubCUTAneous TID WC    insulin lispro  0-6 Units SubCUTAneous Nightly    enoxaparin  1 mg/kg SubCUTAneous BID    docusate sodium  100 mg Oral BID    senna  1 tablet Oral Nightly    polyethylene glycol  17 g Oral Daily    ketorolac  30 mg IntraVENous Q6H    metFORMIN  500 mg Oral BID WC    piperacillin-tazobactam  4,500 mg IntraVENous Q8H     Continuous Infusions:  PRN Meds:morphine, dextrose bolus **OR** dextrose Mother     Other Father         glaucoma     Cancer Father         prostate     No Known Problems Brother     No Known Problems Brother    . Otherwise non-pertinent to the chief complaint. REVIEW OF SYSTEMS:    CONSTITUTIONAL:  No chills, fevers or night sweats. No loss of weight. EYES:  No double vision or drainage from eyes, ears or throat. HEENT:  No neck stiffness. No dysphagia. No drainage from eyes, ears or throat  RESPIRATORY:  No cough, productive sputum or hemoptysis. CARDIOVASCULAR:  No chest pain, palpitations, orthopnea or dyspnea on exertion. GASTROINTESTINAL: See history of present illness  GENITOURINARY:  No frequency burning dysuria or hematuria. INTEGUMENT/BREAST:  No rash or breast masses. HEMATOLOGIC/LYMPHATIC:  No lymphadenopathy or blood dyscrasics. ALLERGIC/IMMUNOLOGIC:  No anaphylaxis. ENDOCRINE:  No polyuria or polydipsia or temperature intolerance. MUSCULOSKELETAL:  No myalgia or arthralgia. Full ROM. NEUROLOGICAL:  No focal motor sensory deficit. BEHAVIOR/PSYCH:  No psychosis. PHYSICAL EXAM:    Vitals:    /79   Pulse (!) 108   Temp 98.4 °F (36.9 °C) (Oral)   Resp 16   Ht 6' 2.5\" (1.892 m)   Wt 242 lb 14.4 oz (110.2 kg)   SpO2 94%   BMI 30.77 kg/m²   Constitutional: The patient is awake, alert, and oriented. Skin: Warm and dry. No rashes were noted. No jaundice. HEENT: Eyes show round, and reactive pupils. Moist mucous membranes, no ulcerations, no thrush. Neck: Supple to movements. No lymphadenopathy. Chest: No use of accessory muscles to breathe. Symmetrical expansion. Auscultation reveals no wheezing, crackles, or rhonchi. Cardiovascular: S1 and S2 are rhythmic and regular. No murmurs appreciated. Abdomen: Positive bowel sounds to auscultation. Benign to palpation. No masses felt. No hepatosplenomegaly. Suprapubic pain with peritoneal signs  Genitourinary: Male  Extremities: No clubbing, no cyanosis, no edema.   Musculoskeletal:  equal and symmetrical  Neurological: No focal  Lines: peripheral      CBC+dif:  Recent Labs     06/23/22 2041 06/23/22  2041 06/24/22  0635 06/24/22  0635 06/25/22  0505   WBC 19.0*  --  13.3*  --  10.2   HGB 15.0   < > 12.2*   < > 12.7   HCT 45.5   < > 36.3*   < > 37.2   MCV 91.5   < > 88.8   < > 87.1      < > 169   < > 183   NEUTROABS 16.28*   < > 10.96*   < > 8.22*    < > = values in this interval not displayed. No results found for: CRP  No results found for: CRPHS  No results found for: SEDRATE  Lab Results   Component Value Date    ALT 11 06/23/2022    AST 14 06/23/2022    ALKPHOS 52 06/23/2022    BILITOT 2.1 (H) 06/23/2022     Lab Results   Component Value Date     06/25/2022    K 3.6 06/25/2022     06/25/2022    CO2 19 06/25/2022    BUN 10 06/25/2022    CREATININE 0.8 06/25/2022    GFRAA >60 06/25/2022    LABGLOM >60 06/25/2022    GLUCOSE 136 06/25/2022    PROT 7.7 06/23/2022    LABALBU 4.5 06/23/2022    CALCIUM 8.3 06/25/2022    BILITOT 2.1 06/23/2022    ALKPHOS 52 06/23/2022    AST 14 06/23/2022    ALT 11 06/23/2022       Lab Results   Component Value Date    PROTIME 15.3 06/24/2022    INR 1.4 06/24/2022       No results found for: TSH    Lab Results   Component Value Date    COLORU Yellow 06/23/2022    PHUR 5.5 06/23/2022    WBCUA NONE 06/23/2022    RBCUA 0-1 06/23/2022    BACTERIA NONE SEEN 06/23/2022    CLARITYU Clear 06/23/2022    SPECGRAV >=1.030 06/23/2022    LEUKOCYTESUR Negative 06/23/2022    UROBILINOGEN 0.2 06/23/2022    BILIRUBINUR MODERATE 06/23/2022    BLOODU TRACE-LYSED 06/23/2022    GLUCOSEU 500 06/23/2022       No results found for: QGN9HWY, BEART, K8PFRXJE, PHART, THGBART, WVX2ASK, PO2ART, CDJ7UYP  Radiology:  CT ABDOMEN PELVIS W IV CONTRAST Additional Contrast? None   Final Result   1. Acute SIGMOID DIVERTICULITIS with evidence of MICROPERFORATION (small   locules of gas in the pericolic fat). No abscess. 2. Liquid stool in the colon indicating diarrheal illness.    3. Small fat containing umbilical hernia. Small amount of fluid in the   herniated fat raises the possibility of FAT STRANGULATION. Clinical   correlation is needed. 4. Small shallow fat containing right inguinal hernia. No evidence of fat   strangulation. RECOMMENDATIONS:   Unavailable             Microbiology:  Pending  Recent Labs     06/23/22  2313   BC 24 Hours no growth     No results for input(s): ORG in the last 72 hours. Recent Labs     06/23/22  2313   BLOODCULT2 24 Hours no growth     No results for input(s): STREPNEUMAGU in the last 72 hours. No results for input(s): LP1UAG in the last 72 hours. No results for input(s): ASO in the last 72 hours. No results for input(s): CULTRESP in the last 72 hours. Assessment:  · Diverticulitis with perforation  · DKA  · Leukocytosis    Plan:    · Cont Zosyn plus Diflucan  · Follow clinically for decision on home IVs versus orals  · Check cultures  · Baseline ESR, CRP  · Monitor labs  · Will follow with you    Thank you for having us see this patient in consultation. I will be discussing this case with the treating physicians.       Electronically signed by Xin Oneill MD on 6/25/2022 at 2:50 PM

## 2022-06-26 LAB
ANION GAP SERPL CALCULATED.3IONS-SCNC: 14 MMOL/L (ref 7–16)
BASOPHILS ABSOLUTE: 0.01 E9/L (ref 0–0.2)
BASOPHILS RELATIVE PERCENT: 0.1 % (ref 0–2)
BUN BLDV-MCNC: 12 MG/DL (ref 6–20)
CALCIUM SERPL-MCNC: 8.3 MG/DL (ref 8.6–10.2)
CHLORIDE BLD-SCNC: 105 MMOL/L (ref 98–107)
CO2: 18 MMOL/L (ref 22–29)
CREAT SERPL-MCNC: 0.8 MG/DL (ref 0.7–1.2)
EOSINOPHILS ABSOLUTE: 0.12 E9/L (ref 0.05–0.5)
EOSINOPHILS RELATIVE PERCENT: 1.4 % (ref 0–6)
GFR AFRICAN AMERICAN: >60
GFR NON-AFRICAN AMERICAN: >60 ML/MIN/1.73
GLUCOSE BLD-MCNC: 124 MG/DL (ref 74–99)
HCT VFR BLD CALC: 34.3 % (ref 37–54)
HEMOGLOBIN: 11.9 G/DL (ref 12.5–16.5)
IMMATURE GRANULOCYTES #: 0.03 E9/L
IMMATURE GRANULOCYTES %: 0.3 % (ref 0–5)
LYMPHOCYTES ABSOLUTE: 1.34 E9/L (ref 1.5–4)
LYMPHOCYTES RELATIVE PERCENT: 15.2 % (ref 20–42)
MAGNESIUM: 1.9 MG/DL (ref 1.6–2.6)
MCH RBC QN AUTO: 29.9 PG (ref 26–35)
MCHC RBC AUTO-ENTMCNC: 34.7 % (ref 32–34.5)
MCV RBC AUTO: 86.2 FL (ref 80–99.9)
METER GLUCOSE: 128 MG/DL (ref 74–99)
METER GLUCOSE: 138 MG/DL (ref 74–99)
METER GLUCOSE: 143 MG/DL (ref 74–99)
METER GLUCOSE: 148 MG/DL (ref 74–99)
MONOCYTES ABSOLUTE: 0.67 E9/L (ref 0.1–0.95)
MONOCYTES RELATIVE PERCENT: 7.6 % (ref 2–12)
NEUTROPHILS ABSOLUTE: 6.62 E9/L (ref 1.8–7.3)
NEUTROPHILS RELATIVE PERCENT: 75.4 % (ref 43–80)
PDW BLD-RTO: 12.5 FL (ref 11.5–15)
PHOSPHORUS: 2.1 MG/DL (ref 2.5–4.5)
PLATELET # BLD: 197 E9/L (ref 130–450)
PMV BLD AUTO: 9.8 FL (ref 7–12)
POTASSIUM SERPL-SCNC: 3.3 MMOL/L (ref 3.5–5)
RBC # BLD: 3.98 E12/L (ref 3.8–5.8)
SODIUM BLD-SCNC: 137 MMOL/L (ref 132–146)
WBC # BLD: 8.8 E9/L (ref 4.5–11.5)

## 2022-06-26 PROCEDURE — 6360000002 HC RX W HCPCS: Performed by: STUDENT IN AN ORGANIZED HEALTH CARE EDUCATION/TRAINING PROGRAM

## 2022-06-26 PROCEDURE — 2580000003 HC RX 258: Performed by: SPECIALIST

## 2022-06-26 PROCEDURE — 6370000000 HC RX 637 (ALT 250 FOR IP): Performed by: INTERNAL MEDICINE

## 2022-06-26 PROCEDURE — 80048 BASIC METABOLIC PNL TOTAL CA: CPT

## 2022-06-26 PROCEDURE — 83735 ASSAY OF MAGNESIUM: CPT

## 2022-06-26 PROCEDURE — 6370000000 HC RX 637 (ALT 250 FOR IP): Performed by: SPECIALIST

## 2022-06-26 PROCEDURE — 2580000003 HC RX 258

## 2022-06-26 PROCEDURE — 82962 GLUCOSE BLOOD TEST: CPT

## 2022-06-26 PROCEDURE — 1200000000 HC SEMI PRIVATE

## 2022-06-26 PROCEDURE — 36415 COLL VENOUS BLD VENIPUNCTURE: CPT

## 2022-06-26 PROCEDURE — 85025 COMPLETE CBC W/AUTO DIFF WBC: CPT

## 2022-06-26 PROCEDURE — 84100 ASSAY OF PHOSPHORUS: CPT

## 2022-06-26 PROCEDURE — 6360000002 HC RX W HCPCS: Performed by: SPECIALIST

## 2022-06-26 PROCEDURE — 6360000002 HC RX W HCPCS: Performed by: INTERNAL MEDICINE

## 2022-06-26 RX ORDER — HEPARIN SODIUM (PORCINE) LOCK FLUSH IV SOLN 100 UNIT/ML 100 UNIT/ML
3 SOLUTION INTRAVENOUS PRN
Status: DISCONTINUED | OUTPATIENT
Start: 2022-06-26 | End: 2022-06-27 | Stop reason: HOSPADM

## 2022-06-26 RX ORDER — SODIUM CHLORIDE 0.9 % (FLUSH) 0.9 %
5-40 SYRINGE (ML) INJECTION PRN
Status: DISCONTINUED | OUTPATIENT
Start: 2022-06-26 | End: 2022-06-27 | Stop reason: HOSPADM

## 2022-06-26 RX ORDER — SODIUM CHLORIDE 9 MG/ML
INJECTION, SOLUTION INTRAVENOUS PRN
Status: DISCONTINUED | OUTPATIENT
Start: 2022-06-26 | End: 2022-06-27 | Stop reason: HOSPADM

## 2022-06-26 RX ORDER — LIDOCAINE HYDROCHLORIDE 10 MG/ML
5 INJECTION, SOLUTION EPIDURAL; INFILTRATION; INTRACAUDAL; PERINEURAL ONCE
Status: COMPLETED | OUTPATIENT
Start: 2022-06-26 | End: 2022-06-27

## 2022-06-26 RX ORDER — SODIUM CHLORIDE 0.9 % (FLUSH) 0.9 %
5-40 SYRINGE (ML) INJECTION EVERY 12 HOURS SCHEDULED
Status: DISCONTINUED | OUTPATIENT
Start: 2022-06-26 | End: 2022-06-27 | Stop reason: HOSPADM

## 2022-06-26 RX ORDER — HEPARIN SODIUM (PORCINE) LOCK FLUSH IV SOLN 100 UNIT/ML 100 UNIT/ML
3 SOLUTION INTRAVENOUS EVERY 12 HOURS SCHEDULED
Status: DISCONTINUED | OUTPATIENT
Start: 2022-06-26 | End: 2022-06-27 | Stop reason: HOSPADM

## 2022-06-26 RX ADMIN — DOCUSATE SODIUM 100 MG: 100 CAPSULE, LIQUID FILLED ORAL at 21:41

## 2022-06-26 RX ADMIN — METFORMIN HYDROCHLORIDE 500 MG: 500 TABLET ORAL at 08:58

## 2022-06-26 RX ADMIN — ENOXAPARIN SODIUM 105 MG: 150 INJECTION SUBCUTANEOUS at 09:00

## 2022-06-26 RX ADMIN — KETOROLAC TROMETHAMINE 30 MG: 30 INJECTION, SOLUTION INTRAMUSCULAR; INTRAVENOUS at 06:20

## 2022-06-26 RX ADMIN — KETOROLAC TROMETHAMINE 30 MG: 30 INJECTION, SOLUTION INTRAMUSCULAR; INTRAVENOUS at 17:05

## 2022-06-26 RX ADMIN — INSULIN LISPRO 2 UNITS: 100 INJECTION, SOLUTION INTRAVENOUS; SUBCUTANEOUS at 12:07

## 2022-06-26 RX ADMIN — PIPERACILLIN AND TAZOBACTAM 4500 MG: 4; .5 INJECTION, POWDER, LYOPHILIZED, FOR SOLUTION INTRAVENOUS at 23:01

## 2022-06-26 RX ADMIN — SENNOSIDES 8.6 MG: 8.6 TABLET, FILM COATED ORAL at 21:38

## 2022-06-26 RX ADMIN — FLUCONAZOLE 200 MG: 100 TABLET ORAL at 08:58

## 2022-06-26 RX ADMIN — PIPERACILLIN AND TAZOBACTAM 4500 MG: 4; .5 INJECTION, POWDER, LYOPHILIZED, FOR SOLUTION INTRAVENOUS at 15:12

## 2022-06-26 RX ADMIN — METOPROLOL SUCCINATE 50 MG: 50 TABLET, EXTENDED RELEASE ORAL at 08:58

## 2022-06-26 RX ADMIN — SODIUM CHLORIDE, PRESERVATIVE FREE 10 ML: 5 INJECTION INTRAVENOUS at 21:44

## 2022-06-26 RX ADMIN — DOCUSATE SODIUM 100 MG: 100 CAPSULE, LIQUID FILLED ORAL at 09:02

## 2022-06-26 RX ADMIN — PIPERACILLIN AND TAZOBACTAM 4500 MG: 4; .5 INJECTION, POWDER, LYOPHILIZED, FOR SOLUTION INTRAVENOUS at 06:24

## 2022-06-26 RX ADMIN — KETOROLAC TROMETHAMINE 30 MG: 30 INJECTION, SOLUTION INTRAMUSCULAR; INTRAVENOUS at 00:35

## 2022-06-26 RX ADMIN — INSULIN LISPRO 2 UNITS: 100 INJECTION, SOLUTION INTRAVENOUS; SUBCUTANEOUS at 17:03

## 2022-06-26 RX ADMIN — ENOXAPARIN SODIUM 105 MG: 150 INJECTION SUBCUTANEOUS at 21:38

## 2022-06-26 RX ADMIN — METFORMIN HYDROCHLORIDE 500 MG: 500 TABLET ORAL at 16:58

## 2022-06-26 ASSESSMENT — PAIN SCALES - GENERAL
PAINLEVEL_OUTOF10: 2
PAINLEVEL_OUTOF10: 6

## 2022-06-26 ASSESSMENT — PAIN DESCRIPTION - LOCATION: LOCATION: ABDOMEN

## 2022-06-26 ASSESSMENT — PAIN DESCRIPTION - ORIENTATION: ORIENTATION: RIGHT;LOWER

## 2022-06-26 NOTE — PROGRESS NOTES
[facility]   Progress note      Subjective: The patient is awake and alert. Abdominal pain nearly resolved  No other acute complaints  Pain only to deep palpation    Objective:    /80   Pulse 98   Temp 98.8 °F (37.1 °C) (Oral)   Resp 16   Ht 6' 2.5\" (1.892 m)   Wt 238 lb 6.4 oz (108.1 kg)   SpO2 98%   BMI 30.20 kg/m²     No intake/output data recorded. No intake/output data recorded. General appearance: NAD, conversant  HEENT: AT/NC, MMM  Neck: FROM, supple  Lungs: Clear to auscultation  CV: RRR, no MRGs  Vasc: Radial pulses 2+  Abdomen: Soft, non-tender; no masses or HSM-tender to deep palpation in lower abdomen suprapubically  Extremities: No peripheral edema or digital cyanosis  Skin: no rash, lesions or ulcers  Psych: Alert and oriented to person, place and time  Neuro: Alert and interactive     Recent Labs     06/24/22  0635 06/25/22  0505 06/26/22  0228   WBC 13.3* 10.2 8.8   HGB 12.2* 12.7 11.9*   HCT 36.3* 37.2 34.3*    183 197       Recent Labs     06/24/22  1113 06/25/22  0505 06/26/22  0228    137 137   K 4.0 3.6 3.3*    106 105   CO2 15* 19* 18*   BUN 12 10 12   CREATININE 0.8 0.8 0.8   CALCIUM 7.9* 8.3* 8.3*       Assessment:    Principal Problem:    Diabetes mellitus with ketoacidosis, controlled ([facility])  Active Problems:    JOSEFINA (obstructive sleep apnea)    Diverticulitis    Paroxysmal atrial fibrillation ([facility])  Resolved Problems:    * No resolved hospital problems.  *      Plan:    Patient is a 65-year-old male admitted to ICU for  DKA  -Monitor labs  -Beta hydroxy >4.50 on admission - now resolved   -Insulin gtt > discontinued, start metformin 1000 p.o. twice daily-renal function is normal  -Current hemoglobin A1c of 6.8 does not warrant immediate initiation of insulin-would continue to monitor on metformin--patient still has not gotten ordered metformin- needs to be resumed today  -ISS glucose control  -Hypoglycemia protocol initiated  -DKA likely from stress state and patient with acute diverticulitis/perforation-doubt medication induced     Diverticulitis with microperforation  -Monitor labs, CBC daily- normal today   -IV Zosyn-transition to p.o. antibiotic ifok w id now that wbc is normal , otherwise await PICC line for ongoing IV antibiotic therapy  -Pain/nausea control PRN  -General Surgery following  - Advance diet as tolerated     PAF chronically   -Continue home medications  -Rate control and oral anticoagulation  Ok to be off tele monitor       DVT Prophylaxis   PT/OT  Discharge Sandhya Rodrigues MD  10:34 AM  6/26/2022

## 2022-06-26 NOTE — PROGRESS NOTES
[address] [place] [address] [facility]  Progress Note    SUBJECTIVE:  Chief Complaint   Patient presents with    Abdominal Pain     lower mid abd pain, 3 days, unable to have bm, used [place]. Nausea     Patient is tolerating medications. No reported adverse drug reactions. No nausea, vomiting, diarrhea. Sitting on the edge of the bed eating lunch  Feeling well, has some ABD pain but improved  No fevers    Review of systems:  As stated above in the chief complaint, otherwise negative. Medications:  Scheduled Meds:   fluconazole  200 mg Oral Daily    metoprolol succinate  50 mg Oral Daily    insulin lispro  0-12 Units SubCUTAneous TID WC    insulin lispro  0-6 Units SubCUTAneous Nightly    enoxaparin  1 mg/kg SubCUTAneous BID    docusate sodium  100 mg Oral BID    senna  1 tablet Oral Nightly    polyethylene glycol  17 g Oral Daily    ketorolac  30 mg IntraVENous Q6H    metFORMIN  500 mg Oral BID WC    piperacillin-tazobactam  4,500 mg IntraVENous Q8H     Continuous Infusions:   PRN Meds:morphine, dextrose bolus **OR** dextrose bolus    OBJECTIVE:  /80   Pulse 98   Temp 98.8 °F (37.1 °C) (Oral)   Resp 16   Ht 6' 2.5\" (1.892 m)   Wt 238 lb 6.4 oz (108.1 kg)   SpO2 98%   BMI 30.20 kg/m²   Temp  Av.8 °F (37.1 °C)  Min: 98.8 °F (37.1 °C)  Max: 98.8 °F (37.1 °C)  Constitutional: The patient is awake, alert, and oriented. Skin: Warm and dry. No rashes were noted. HEENT: Round and reactive pupils. Moist mucous membranes. No ulcerations or thrush. Neck: Supple to movements. Chest: No use of accessory muscles to breathe. Symmetrical expansion. No wheezing, crackles or rhonchi. Cardiovascular: S1 and S2 are rhythmic and regular. No murmurs appreciated. Abdomen: Positive bowel sounds to auscultation. Benign to palpation. No masses felt. No hepatosplenomegaly. Lower abdominal pain on palpation  Extremities: No clubbing, no cyanosis, no edema.   Lines: peripheral    Laboratory and Tests Review:  Lab Results   Component Value Date    WBC 8.8 06/26/2022    WBC 10.2 06/25/2022    WBC 13.3 (H) 06/24/2022    HGB 11.9 (L) 06/26/2022    HCT 34.3 (L) 06/26/2022    MCV 86.2 06/26/2022     06/26/2022     Lab Results   Component Value Date    NEUTROABS 6.62 06/26/2022    NEUTROABS 8.22 (H) 06/25/2022    NEUTROABS 10.96 (H) 06/24/2022     No results found for: CRPHS  Lab Results   Component Value Date    ALT 11 06/23/2022    AST 14 06/23/2022    ALKPHOS 52 06/23/2022    BILITOT 2.1 (H) 06/23/2022     Lab Results   Component Value Date     06/26/2022    K 3.3 06/26/2022     06/26/2022    CO2 18 06/26/2022    BUN 12 06/26/2022    CREATININE 0.8 06/26/2022    CREATININE 0.8 06/25/2022    CREATININE 0.8 06/24/2022    GFRAA >60 06/26/2022    LABGLOM >60 06/26/2022    GLUCOSE 124 06/26/2022    PROT 7.7 06/23/2022    LABALBU 4.5 06/23/2022    CALCIUM 8.3 06/26/2022    BILITOT 2.1 06/23/2022    ALKPHOS 52 06/23/2022    AST 14 06/23/2022    ALT 11 06/23/2022     No results found for: CRP  No results found for: Syed Patel  Radiology:      Microbiology:   MRSA nares: negative  Blood Culture 6/23/22: negative so far    ASSESSMENT:  Diverticulitis with perforation  DKA  Leukocytosis    PLAN:  Continue Zosyn plus Diflucan  PICC line  Surgery planning for outpatient scope  Check final cultures  Monitor labs    SAGRARIO Flores CNP  12:40 PM  6/26/2022     Pt seen and examined. Above discussed agree with advanced practice nurse. Labs, cultures, and radiographs reviewed. Face to Face encounter occurred. Changes made as necessary.      Edy Mondragon MD

## 2022-06-27 ENCOUNTER — APPOINTMENT (OUTPATIENT)
Dept: GENERAL RADIOLOGY | Age: 57
DRG: 391 | End: 2022-06-27
Payer: COMMERCIAL

## 2022-06-27 VITALS
BODY MASS INDEX: 30.21 KG/M2 | OXYGEN SATURATION: 95 % | HEIGHT: 75 IN | WEIGHT: 243 LBS | TEMPERATURE: 98.4 F | HEART RATE: 96 BPM | DIASTOLIC BLOOD PRESSURE: 77 MMHG | SYSTOLIC BLOOD PRESSURE: 129 MMHG | RESPIRATION RATE: 16 BRPM

## 2022-06-27 LAB
ANION GAP SERPL CALCULATED.3IONS-SCNC: 12 MMOL/L (ref 7–16)
BASOPHILS ABSOLUTE: 0.03 E9/L (ref 0–0.2)
BASOPHILS RELATIVE PERCENT: 0.3 % (ref 0–2)
BUN BLDV-MCNC: 15 MG/DL (ref 6–20)
CALCIUM SERPL-MCNC: 8.7 MG/DL (ref 8.6–10.2)
CHLORIDE BLD-SCNC: 106 MMOL/L (ref 98–107)
CO2: 22 MMOL/L (ref 22–29)
CREAT SERPL-MCNC: 0.9 MG/DL (ref 0.7–1.2)
EOSINOPHILS ABSOLUTE: 0.14 E9/L (ref 0.05–0.5)
EOSINOPHILS RELATIVE PERCENT: 1.6 % (ref 0–6)
GFR AFRICAN AMERICAN: >60
GFR NON-AFRICAN AMERICAN: >60 ML/MIN/1.73
GLUCOSE BLD-MCNC: 139 MG/DL (ref 74–99)
HCT VFR BLD CALC: 35.4 % (ref 37–54)
HEMOGLOBIN: 12.1 G/DL (ref 12.5–16.5)
IMMATURE GRANULOCYTES #: 0.03 E9/L
IMMATURE GRANULOCYTES %: 0.3 % (ref 0–5)
LYMPHOCYTES ABSOLUTE: 1.41 E9/L (ref 1.5–4)
LYMPHOCYTES RELATIVE PERCENT: 16.3 % (ref 20–42)
MAGNESIUM: 1.9 MG/DL (ref 1.6–2.6)
MCH RBC QN AUTO: 29.6 PG (ref 26–35)
MCHC RBC AUTO-ENTMCNC: 34.2 % (ref 32–34.5)
MCV RBC AUTO: 86.6 FL (ref 80–99.9)
METER GLUCOSE: 116 MG/DL (ref 74–99)
METER GLUCOSE: 129 MG/DL (ref 74–99)
METER GLUCOSE: 160 MG/DL (ref 74–99)
MONOCYTES ABSOLUTE: 0.63 E9/L (ref 0.1–0.95)
MONOCYTES RELATIVE PERCENT: 7.3 % (ref 2–12)
NEUTROPHILS ABSOLUTE: 6.42 E9/L (ref 1.8–7.3)
NEUTROPHILS RELATIVE PERCENT: 74.2 % (ref 43–80)
PDW BLD-RTO: 12.5 FL (ref 11.5–15)
PHOSPHORUS: 2.7 MG/DL (ref 2.5–4.5)
PLATELET # BLD: 228 E9/L (ref 130–450)
PMV BLD AUTO: 9.9 FL (ref 7–12)
POTASSIUM SERPL-SCNC: 3.4 MMOL/L (ref 3.5–5)
RBC # BLD: 4.09 E12/L (ref 3.8–5.8)
SODIUM BLD-SCNC: 140 MMOL/L (ref 132–146)
WBC # BLD: 8.7 E9/L (ref 4.5–11.5)

## 2022-06-27 PROCEDURE — 6370000000 HC RX 637 (ALT 250 FOR IP): Performed by: SPECIALIST

## 2022-06-27 PROCEDURE — 84100 ASSAY OF PHOSPHORUS: CPT

## 2022-06-27 PROCEDURE — 36415 COLL VENOUS BLD VENIPUNCTURE: CPT

## 2022-06-27 PROCEDURE — 85025 COMPLETE CBC W/AUTO DIFF WBC: CPT

## 2022-06-27 PROCEDURE — 76937 US GUIDE VASCULAR ACCESS: CPT

## 2022-06-27 PROCEDURE — 2580000003 HC RX 258: Performed by: SPECIALIST

## 2022-06-27 PROCEDURE — 6370000000 HC RX 637 (ALT 250 FOR IP): Performed by: INTERNAL MEDICINE

## 2022-06-27 PROCEDURE — 36569 INSJ PICC 5 YR+ W/O IMAGING: CPT

## 2022-06-27 PROCEDURE — 02HV33Z INSERTION OF INFUSION DEVICE INTO SUPERIOR VENA CAVA, PERCUTANEOUS APPROACH: ICD-10-PCS | Performed by: INTERNAL MEDICINE

## 2022-06-27 PROCEDURE — 83735 ASSAY OF MAGNESIUM: CPT

## 2022-06-27 PROCEDURE — 71045 X-RAY EXAM CHEST 1 VIEW: CPT

## 2022-06-27 PROCEDURE — 2500000003 HC RX 250 WO HCPCS

## 2022-06-27 PROCEDURE — 6360000002 HC RX W HCPCS: Performed by: SPECIALIST

## 2022-06-27 PROCEDURE — 80048 BASIC METABOLIC PNL TOTAL CA: CPT

## 2022-06-27 PROCEDURE — C1751 CATH, INF, PER/CENT/MIDLINE: HCPCS

## 2022-06-27 PROCEDURE — 6360000002 HC RX W HCPCS: Performed by: INTERNAL MEDICINE

## 2022-06-27 PROCEDURE — 82962 GLUCOSE BLOOD TEST: CPT

## 2022-06-27 PROCEDURE — 6360000002 HC RX W HCPCS: Performed by: STUDENT IN AN ORGANIZED HEALTH CARE EDUCATION/TRAINING PROGRAM

## 2022-06-27 RX ORDER — FLUCONAZOLE 200 MG/1
200 TABLET ORAL DAILY
Qty: 21 TABLET | Refills: 0 | Status: SHIPPED | OUTPATIENT
Start: 2022-06-28 | End: 2022-07-19

## 2022-06-27 RX ADMIN — METFORMIN HYDROCHLORIDE 500 MG: 500 TABLET ORAL at 09:19

## 2022-06-27 RX ADMIN — PIPERACILLIN AND TAZOBACTAM 4500 MG: 4; .5 INJECTION, POWDER, LYOPHILIZED, FOR SOLUTION INTRAVENOUS at 06:59

## 2022-06-27 RX ADMIN — KETOROLAC TROMETHAMINE 30 MG: 30 INJECTION, SOLUTION INTRAMUSCULAR; INTRAVENOUS at 11:17

## 2022-06-27 RX ADMIN — LIDOCAINE HYDROCHLORIDE 5 ML: 10 SOLUTION INTRAVENOUS at 12:27

## 2022-06-27 RX ADMIN — DOCUSATE SODIUM 100 MG: 100 CAPSULE, LIQUID FILLED ORAL at 09:19

## 2022-06-27 RX ADMIN — METOPROLOL SUCCINATE 50 MG: 50 TABLET, EXTENDED RELEASE ORAL at 09:19

## 2022-06-27 RX ADMIN — FLUCONAZOLE 200 MG: 100 TABLET ORAL at 09:19

## 2022-06-27 RX ADMIN — INSULIN LISPRO 2 UNITS: 100 INJECTION, SOLUTION INTRAVENOUS; SUBCUTANEOUS at 11:17

## 2022-06-27 RX ADMIN — PIPERACILLIN AND TAZOBACTAM 4500 MG: 4; .5 INJECTION, POWDER, LYOPHILIZED, FOR SOLUTION INTRAVENOUS at 14:35

## 2022-06-27 RX ADMIN — METFORMIN HYDROCHLORIDE 500 MG: 500 TABLET ORAL at 16:41

## 2022-06-27 RX ADMIN — ENOXAPARIN SODIUM 105 MG: 150 INJECTION SUBCUTANEOUS at 09:19

## 2022-06-27 RX ADMIN — KETOROLAC TROMETHAMINE 30 MG: 30 INJECTION, SOLUTION INTRAMUSCULAR; INTRAVENOUS at 06:57

## 2022-06-27 ASSESSMENT — PAIN DESCRIPTION - LOCATION: LOCATION: ABDOMEN

## 2022-06-27 ASSESSMENT — PAIN SCALES - GENERAL
PAINLEVEL_OUTOF10: 3
PAINLEVEL_OUTOF10: 2

## 2022-06-27 ASSESSMENT — PAIN - FUNCTIONAL ASSESSMENT: PAIN_FUNCTIONAL_ASSESSMENT: ACTIVITIES ARE NOT PREVENTED

## 2022-06-27 ASSESSMENT — PAIN DESCRIPTION - ORIENTATION: ORIENTATION: LOWER

## 2022-06-27 ASSESSMENT — PAIN DESCRIPTION - DESCRIPTORS: DESCRIPTORS: DISCOMFORT

## 2022-06-27 NOTE — PROGRESS NOTES
Left message with Dr. Tonia Galvin regarding ID recommendations for antibiotics at discharge, [facility] set up, and K of 3.4. Await orders or return call.

## 2022-06-27 NOTE — PROCEDURES
PICC   Catheter insertion date: 6/27/2022     Product Number:  [facility] [id number] VPS2   Lot No: [id number]   Gauge: 17   Lumen: single   R Basilic    Vein Diameter: 0.58cm   Arm circumference at insertion site: 31cm   Catheter Length: 50cm   Internal Length: 46cm   External Catheter Length: 4cm   Ultrasound Used: yes  CXR reads tip at [facility], floor notified ok to use.   : ROSANGELA Fernandez RN

## 2022-06-27 NOTE — PROGRESS NOTES
Comprehensive Nutrition Assessment    Type and Reason for Visit:  Initial,Positive Nutrition Screen,Patient Education    Nutrition Recommendations/Plan:   1. Pt inst on low fiber diet w/stated understanding. Seen by diab ed for diabetic component. Continue current low fiber (diverticular disease), carb cont (4 carbs) diet as [place]. Will continue to monitor while inpatient       Nutrition Assessment:    Adm:Diverticulitis with perforation, DM,  Ketoacidosis. Follows diab diet at home, adm no glucose monitoring. HgB A1C=6.8%. Seen by akanksha education 6/24. Pt instructed today on low fiber diet (diet for diverticular disease). Adm appetite good, tolerating diet. Receptive to education and expect good compliance. Denies wt loss. Pt is at low nut'l risk for malnutrition. Nutrition Related Findings:    A& O, no n&V, no diarrhea. + BS, no I/O record, no eedma Wound Type: None       Current Nutrition Intake & Therapies:    Average Meal Intake: %     ADULT DIET; Regular; 4 carb choices (60 gm/meal); Low Fiber; No Carbonated Beverages    Anthropometric Measures:  Height: 6' 2.5\" (189.2 cm)  Ideal Body Weight (IBW): 193 lbs (88 kg)    Admission Body Weight: 0 lb (0 kg) (stated wt of 238#)  Current Body Weight: 243 lb (110.2 kg) (6/27, bed scale), 125.9 % IBW. Weight Source: Bed Scale  Current BMI (kg/m2): 30.8  Usual Body Weight:  (reports -240#, no previous wt in EMR)     BMI Categories: Obese Class 1 (BMI 30.0-34. 9)    Nutrition Diagnosis:   · No nutrition diagnosis at this time related to   as evidenced by        Nutrition Interventions:   Food and/or Nutrient Delivery: Continue Current Diet  Nutrition Education/Counseling: Education completed (Inst on low fiber diet (diet for diverticular disease))  Coordination of Nutrition Care: No recommendation at this time       Goals:     Goals: Meet at least 75% of estimated needs       Nutrition Monitoring and Evaluation:      Food/Nutrient Intake Outcomes: Food and Nutrient Intake  Physical Signs/Symptoms Outcomes: Biochemical Data,Chewing or Swallowing,Constipation,Diarrhea,Hemodynamic Status,Fluid Status or Edema,Nausea or Vomiting,GI Status,Nutrition Focused Physical Findings,Weight    Discharge Planning:    Continue current diet     Genesis Duarte RD, CNSC, LD  Contact: [phone]

## 2022-06-27 NOTE — PROGRESS NOTES
[address] [place] [address] [facility]  Progress Note    SUBJECTIVE:  Chief Complaint   Patient presents with    Abdominal Pain     lower mid abd pain, 3 days, unable to have bm, used [place]. Nausea     Patient is tolerating medications. No reported adverse drug reactions. No nausea, vomiting, diarrhea. Sitting in bed, feeling well  Has some abdominal pressure but not pain  No fevers    Review of systems:  As stated above in the chief complaint, otherwise negative. Medications:  Scheduled Meds:   lidocaine PF  5 mL IntraDERmal Once    sodium chloride flush  5-40 mL IntraVENous 2 times per day    heparin flush  3 mL IntraVENous 2 times per day    fluconazole  200 mg Oral Daily    metoprolol succinate  50 mg Oral Daily    insulin lispro  0-12 Units SubCUTAneous TID WC    insulin lispro  0-6 Units SubCUTAneous Nightly    enoxaparin  1 mg/kg SubCUTAneous BID    docusate sodium  100 mg Oral BID    senna  1 tablet Oral Nightly    polyethylene glycol  17 g Oral Daily    ketorolac  30 mg IntraVENous Q6H    metFORMIN  500 mg Oral BID WC    piperacillin-tazobactam  4,500 mg IntraVENous Q8H     Continuous Infusions:   sodium chloride       PRN Meds:sodium chloride flush, sodium chloride, heparin flush, morphine, dextrose bolus **OR** dextrose bolus    OBJECTIVE:  /77   Pulse 96   Temp 98.4 °F (36.9 °C) (Oral)   Resp 16   Ht 6' 2.5\" (1.892 m)   Wt 243 lb (110.2 kg)   SpO2 95%   BMI 30.78 kg/m²   Temp  Av.4 °F (36.9 °C)  Min: 98.4 °F (36.9 °C)  Max: 98.4 °F (36.9 °C)  Constitutional: The patient is awake, alert, and oriented. Skin: Warm and dry. No rashes were noted. HEENT: Round and reactive pupils. Moist mucous membranes. No ulcerations or thrush. Neck: Supple to movements. Chest: No use of accessory muscles to breathe. Symmetrical expansion. No wheezing, crackles or rhonchi. Cardiovascular: S1 and S2 are rhythmic and regular. No murmurs appreciated.    Abdomen: Positive bowel sounds to auscultation. Benign to palpation. No masses felt. No hepatosplenomegaly. Lower abdominal pressure  Extremities: No clubbing, no cyanosis, no edema. Lines: peripheral    Laboratory and Tests Review:  Lab Results   Component Value Date    WBC 8.7 06/27/2022    WBC 8.8 06/26/2022    WBC 10.2 06/25/2022    HGB 12.1 (L) 06/27/2022    HCT 35.4 (L) 06/27/2022    MCV 86.6 06/27/2022     06/27/2022     Lab Results   Component Value Date    NEUTROABS 6.42 06/27/2022    NEUTROABS 6.62 06/26/2022    NEUTROABS 8.22 (H) 06/25/2022     No results found for: CRPHS  Lab Results   Component Value Date    ALT 11 06/23/2022    AST 14 06/23/2022    ALKPHOS 52 06/23/2022    BILITOT 2.1 (H) 06/23/2022     Lab Results   Component Value Date     06/27/2022    K 3.4 06/27/2022     06/27/2022    CO2 22 06/27/2022    BUN 15 06/27/2022    CREATININE 0.9 06/27/2022    CREATININE 0.8 06/26/2022    CREATININE 0.8 06/25/2022    GFRAA >60 06/27/2022    LABGLOM >60 06/27/2022    GLUCOSE 139 06/27/2022    PROT 7.7 06/23/2022    LABALBU 4.5 06/23/2022    CALCIUM 8.7 06/27/2022    BILITOT 2.1 06/23/2022    ALKPHOS 52 06/23/2022    AST 14 06/23/2022    ALT 11 06/23/2022     No results found for: CRP  No results found for: [address]  Radiology:      Microbiology:   MRSA nares: negative  Blood Culture 6/23/22: negative so far    ASSESSMENT:  Diverticulitis with perforation  DKA  Leukocytosis    PLAN:  Continue Zosyn plus Diflucan for at least 3 weeks  PICC line  Surgery planning for outpatient scope  Check final cultures  Monitor labs  Okay to DC after PICC is placed - scripts in chart    SAGRARIO Ferrera - CNP  10:07 AM  6/27/2022   Pt seen and examined. Above discussed agree with advanced practice nurse. Labs, cultures, and radiographs reviewed. Face to Face encounter occurred. Changes made as necessary.      Deepthi Evans MD

## 2022-06-27 NOTE — HOME CARE
JAM TSAI  Ordered therapy at time of quote: Reva Mckinnone 4.5GM IV Q8H  Deductible: $1000  Coverage: 80%  Out-of-Pocket Max: $2000  Total pt responsibility (after deductible met): $40.95/DAY

## 2022-06-27 NOTE — CARE COORDINATION
Pt infusion service agreement signed by pt; faxed to kenneth Rivas along with iv at[facility]. Miracle Jett.

## 2022-06-27 NOTE — PROGRESS NOTES
GENERAL SURGERY  DAILY PROGRESS NOTE  6/27/2022    Subjective:  No new complaints or overnight events. Feeling better. 2 bowel movements within the past 24 hous    Objective:  /77   Pulse 96   Temp 98.4 °F (36.9 °C) (Oral)   Resp 16   Ht 6' 2.5\" (1.892 m)   Wt 243 lb (110.2 kg)   SpO2 95%   BMI 30.78 kg/m²     General Appearance:  awake, alert, oriented, in no acute distress  Skin:  Skin color, texture, turgor normal  Head/face:  NCAT  Eyes:  No gross abnormalities. Sclera nonicteric  Lungs/Chest:  Normal expansion. No respiratory distress. On room air  Heart: Warm throughout. Regular rate   Abdomen:  Soft, moderate tenderness, worst on the left side, mildly distended. Extremities: Extremities warm to touch, pink      I have personally reviewed all relevant labs and imaging.   No leukocytosis    Assessment/Plan:  61 y.o. male with acute diverticulitis with microperforation    - Low fiber diet as tolerated  - antibiotics/PICC per ID  - pain/nausea control PRN  - outpatient colonoscopy once symptoms resolve    Electronically signed by Jane Salgado DO on 6/27/2022 at 8:28 AM     Agree with the assessment and plan

## 2022-06-27 NOTE — HOME CARE
CALLED OUT TO NURSES STATION, AND LEFT MESSAGE TO CALL HOME CARE ASAP TO VERIFY WHAT TIME PATIENT IS DISCHARGING.      Darrel Zhong LPN   [facility]

## 2022-06-27 NOTE — DISCHARGE SUMMARY
[facility]   Discharge summary   Patient ID:  Sanjeev Hsu  [id number]  61 y.o.  1965    Admit date: 6/23/2022    Discharge date and time: 6/27/2022    Admission Diagnoses:   Patient Active Problem List   Diagnosis    History of nuclear stress test    Paroxysmal atrial fibrillation ([facility] [place] [address]    Diabetes mellitus with ketoacidosis, controlled ([facility] [place] [address]    JOSEFINA (obstructive sleep apnea)    Diverticulitis       Discharge Diagnoses: Diverticulitis with microperforation, diabetic ketoacidosis    Consults: Infectious disease, critical care, general surgery    Procedures: None    Hospital Course: Patient is a 79-year-old male admitted to ICU for  DKA-resolved  -Monitor labs  -Beta hydroxy >4.50 on admission - now resolved   -Insulin gtt > discontinued, start metformin 500 p.o. twice daily-renal function is normal  -Current hemoglobin A1c of 6.8 does not warrant immediate initiation of insulin-would continue to monitor on metformin only--discontinue the combination medication he was taking at home  -DKA likely from stress state and patient with acute diverticulitis/perforation-doubt medication induced     Diverticulitis with microperforation  -Monitor labs, CBC daily- normal today   -IV Zosyn- PICC line for ongoing IV antibiotic therapy x3 weeks  -Pain/nausea control PRN  -General Surgery following  - Advance diet as tolerated-tolerating fine    Chronic atrial fibrillation paroxysmal a  Continue rate control and oral anticoagulation at discharge       Recent Labs     06/25/22  0505 06/26/22  0228 06/27/22  0520   WBC 10.2 8.8 8.7   HGB 12.7 11.9* 12.1*   HCT 37.2 34.3* 35.4*    197 228       Recent Labs     06/25/22  0505 06/26/22  0228 06/27/22  0520    137 140   K 3.6 3.3* 3.4*    105 106   CO2 19* 18* 22   BUN 10 12 15   CREATININE 0.8 0.8 0.9   CALCIUM 8.3* 8.3* 8.7       CT ABDOMEN PELVIS W IV CONTRAST Additional Contrast? None    Result Date: 6/23/2022  EXAMINATION: CT OF THE ABDOMEN AND PELVIS WITH CONTRAST 6/23/2022 9:54 pm TECHNIQUE: CT of the abdomen and pelvis was performed with the administration of intravenous contrast. Multiplanar reformatted images are provided for review. Automated exposure control, iterative reconstruction, and/or weight based adjustment of the mA/kV was utilized to reduce the radiation dose to as low as reasonably achievable. COMPARISON: None. CONTRAST: 50 cc Isovue-370 was injected intravenously. HISTORY: ORDERING SYSTEM PROVIDED HISTORY: Lower abdominal pain, conatipation TECHNOLOGIST PROVIDED HISTORY: Reason for exam:->Lower abdominal pain, conatipation Additional Contrast?->None Decision Support Exception - unselect if not a suspected or confirmed emergency medical condition->Emergency Medical Condition (MA) FINDINGS: Lower Chest: Mild dependent atelectasis in the lower lobes. The lung bases are otherwise clear. The heart is normal in size. No pleural or pericardial effusion noted. Organs: Liver: Unremarkable. Gallbladder: Unremarkable. Pancreas: Unremarkable. Spleen:  Unremarkable. Adrenals: Unremarkable. Kidneys: Too small to characterize probable cysts in the kidneys. Otherwise, unremarkable. GI/Bowel: Pericolic edema along the sigmoid colon consistent with acute diverticulitis. Tiny locules of gas in the pericolic fat along the inflamed sigmoid colon indicating micro perforation. No abscess. Liquid stool is seen in the colon, indicating diarrheal illness. Normal appendix. Pelvis: The urinary bladder and the prostate are grossly unremarkable. Small, shallow fat containing right inguinal hernia. No evidence of fat strangulation. Peritoneum/Retroperitoneum: Mild calcified atherosclerosis is seen in the aorta. No aneurysm. No ascites. No lymphadenopathy. Bones/Soft Tissues: The visualized bones are intact without fracture or focal lesion. Miscellaneous: Small fat containing umbilical hernia.   Small amount of edema within the herniated fat raise the possibility of strangulation. 1. Acute SIGMOID DIVERTICULITIS with evidence of MICROPERFORATION (small locules of gas in the pericolic fat). No abscess. 2. Liquid stool in the colon indicating diarrheal illness. 3. Small fat containing umbilical hernia. Small amount of fluid in the herniated fat raises the possibility of FAT STRANGULATION. Clinical correlation is needed. 4. Small shallow fat containing right inguinal hernia. No evidence of fat strangulation. RECOMMENDATIONS: Unavailable       Discharge Exam:    HEENT: NCAT,  PERRLA, No JVD  Heart:  RRR, no murmurs, gallops, or rubs. Lungs:  CTA bilaterally, no wheeze, rales or rhonchi  Abd: bowel sounds present, nontender, nondistended, no masses  Extrem:  No clubbing, cyanosis, or edema    Disposition: home     Patient Condition at Discharge: Stable    Patient Instructions:      Medication List      START taking these medications    fluconazole 200 MG tablet  Commonly known as: DIFLUCAN  Take 1 tablet by mouth daily for 21 days  Start taking on: June 28, 2022     piperacillin-tazobactam  infusion  Commonly known as: ZOSYN  Infuse 4.5 g intravenously every 8 hours for 21 days Compound per protocol.         CONTINUE taking these medications    Bystolic 5 MG tablet  Generic drug: nebivolol     CPAP Machine Misc     Nexlizet 180-10 MG Tabs  Generic drug: Bempedoic Acid-Ezetimibe     rivaroxaban 20 MG Tabs tablet  Commonly known as: Deepti Snellen 7.5-1000 MG Tabs  Generic drug: Ertugliflozin-metFORMIN HCl           Where to Get Your Medications      These medications were sent to [address] [place] [address]    Phone: [phone]   · fluconazole 200 MG tablet     You can get these medications from any pharmacy    Bring a paper prescription for each of these medications  · piperacillin-tazobactam  infusion       Activity: activity as tolerated  Diet: diabetic diet    Pt has been advised to: Follow-up with Elder Wright MD in 1 week.   Follow-up with consultants as recommended by them    Note that over 30 minutes was spent in preparing discharge papers, discussing discharge with patient, medication review, etc.    Signed:  Bandar Mensah MD  6/27/2022  1:52 PM

## 2022-06-28 ENCOUNTER — CARE COORDINATION (OUTPATIENT)
Dept: OTHER | Facility: CLINIC | Age: 57
End: 2022-06-28

## 2022-06-28 NOTE — CARE COORDINATION
[facility] Transitions Initial Follow Up Call    Call within 2 business days of discharge: Yes    Patient: Shikha Kirby Patient : 1965   MRN: [id number]  Reason for Admission: Abd pain/ diverticulitits  Discharge Date: 22 RARS: Readmission Risk Score: 5.3 ( )      Last Discharge [facility]       Complaint Diagnosis Description Type Department Provider    22 Abdominal Pain; Nausea Diverticulitis of colon . .. ED to Hosp-Admission (Discharged) (ADMITTED) Yvon Johansen MD; Brianda Hamilton... Non-face-to-face services provided:  Obtained and reviewed discharge summary and/or continuity of care documents    Care Transitions 24 Hour Call    Schedule Follow Up Appointment with PCP: Declined  Do you have a copy of your discharge instructions?: Yes  Do you have all of your prescriptions and are they filled?: Yes  Have you been contacted by a [facility] Pharmacist?: Yes  Have you scheduled your follow up appointment?: No  Do you feel like you have everything you need to keep you well at home?: Yes  Care Transitions Interventions     Other Services: Completed (Comment: Home Health Care/ IV antibiotics)        Was this an external facility discharge? No Discharge Facility: [address] [place] [address] to be reviewed by the provider   Additional needs identified to be addressed with provider: No  none         Method of communication with provider : none    Advance Care Planning:   Does patient have an Advance Directive: not on file. Associate Care Manager [facility]) contacted the patientby telephone to perform post hospital discharge assessment. Verified name and  with patientas identifiers. Provided introduction to self, and explanation of the ACM role. ACM reviewed discharge instructions, medical action plan and red flags with patient who verbalized understanding. Patient given an opportunity to ask questions and does not have any further questions or concerns at this time.  Were discharge instructions available to patient? Yes. Reviewed appropriate site of care based on symptoms and resources available to patient including:   PCP  Benefits related nurse triage line  Urgent care clinics  Home health  MyChart Messaging. The patient agrees to contact the PCP office for questions related to their healthcare. Patient reports that he feels overall he is doing well. He states he has his IC antibiotics set up. He has not been testing his BG but will do so more often. No changes in his DM medications were made. He has not rios his follow up appts bet. He state he will call for appt after he get his labs done this week. He denies any immediate ACM needs at [facility] and agreeable to follow up contact. Medication reconciliation was performed with patient, who verbalizes understanding of administration of home medications. Advised obtaining a 90-day supply of all daily and as-needed medications. Was patient discharged with a pulse oximeter? no    Education Provided/ Reinforced:   Red Flag symptoms to report  Self monitoring compliance  Symptom management  Diet education/compliance  Medication compliance  Provider follow up appointment compliance  Utilization of appropriate level of care: Right Care, Right Place, Right Time  Reinforced Discharge instructions    Non-face-to-face services provided:  Obtained and reviewed discharge summary and/or continuity of care documents    Discussed follow-up appointments. If no appointment was previously scheduled, appointment scheduling offered: Yes, declined   Is follow up appointment scheduled within 7 days of discharge? To be scheduled  REHABILITATION [facility] follow up appointment(s): No future appointments. Non-[facility] follow up appointment(s): None      ACM provided contact information. Plan for follow-up call in 3-5 days based on severity of symptoms and risk factors.     Plan for next call:   Symptom management  Medication compliance  Provider follow up appointment compliance    ACM provided contact information for future needs. Follow Up  No future appointments.     Angelic Malone RN

## 2022-06-29 LAB
BLOOD CULTURE, ROUTINE: NORMAL
CULTURE, BLOOD 2: NORMAL

## 2022-06-30 ENCOUNTER — HOSPITAL ENCOUNTER (OUTPATIENT)
Age: 57
Discharge: HOME | End: 2022-06-30
Payer: COMMERCIAL

## 2022-06-30 LAB
ANION GAP SERPL CALCULATED.3IONS-SCNC: 17 MMOL/L (ref 7–16)
BASOPHILS ABSOLUTE: 0.04 E9/L (ref 0–0.2)
BASOPHILS RELATIVE PERCENT: 0.3 % (ref 0–2)
BUN BLDV-MCNC: 13 MG/DL (ref 6–20)
CALCIUM SERPL-MCNC: 9.2 MG/DL (ref 8.6–10.2)
CHLORIDE BLD-SCNC: 106 MMOL/L (ref 98–107)
CO2: 22 MMOL/L (ref 22–29)
CREAT SERPL-MCNC: 1 MG/DL (ref 0.7–1.2)
EOSINOPHILS ABSOLUTE: 0.14 E9/L (ref 0.05–0.5)
EOSINOPHILS RELATIVE PERCENT: 1.2 % (ref 0–6)
GFR AFRICAN AMERICAN: >60
GFR NON-AFRICAN AMERICAN: >60 ML/MIN/1.73
GLUCOSE BLD-MCNC: 126 MG/DL (ref 74–99)
HCT VFR BLD CALC: 39.5 % (ref 37–54)
HEMOGLOBIN: 13.1 G/DL (ref 12.5–16.5)
IMMATURE GRANULOCYTES #: 0.05 E9/L
IMMATURE GRANULOCYTES %: 0.4 % (ref 0–5)
LYMPHOCYTES ABSOLUTE: 1.89 E9/L (ref 1.5–4)
LYMPHOCYTES RELATIVE PERCENT: 16 % (ref 20–42)
MAGNESIUM: 2 MG/DL (ref 1.6–2.6)
MCH RBC QN AUTO: 29.1 PG (ref 26–35)
MCHC RBC AUTO-ENTMCNC: 33.2 % (ref 32–34.5)
MCV RBC AUTO: 87.8 FL (ref 80–99.9)
MONOCYTES ABSOLUTE: 0.89 E9/L (ref 0.1–0.95)
MONOCYTES RELATIVE PERCENT: 7.5 % (ref 2–12)
NEUTROPHILS ABSOLUTE: 8.81 E9/L (ref 1.8–7.3)
NEUTROPHILS RELATIVE PERCENT: 74.6 % (ref 43–80)
PDW BLD-RTO: 12.7 FL (ref 11.5–15)
PHOSPHORUS: 3.6 MG/DL (ref 2.5–4.5)
PLATELET # BLD: 329 E9/L (ref 130–450)
PMV BLD AUTO: 9.6 FL (ref 7–12)
POTASSIUM SERPL-SCNC: 3.7 MMOL/L (ref 3.5–5)
RBC # BLD: 4.5 E12/L (ref 3.8–5.8)
SODIUM BLD-SCNC: 145 MMOL/L (ref 132–146)
WBC # BLD: 11.8 E9/L (ref 4.5–11.5)

## 2022-06-30 PROCEDURE — 85025 COMPLETE CBC W/AUTO DIFF WBC: CPT

## 2022-06-30 PROCEDURE — 83735 ASSAY OF MAGNESIUM: CPT

## 2022-06-30 PROCEDURE — 84100 ASSAY OF PHOSPHORUS: CPT

## 2022-06-30 PROCEDURE — 36415 COLL VENOUS BLD VENIPUNCTURE: CPT

## 2022-06-30 PROCEDURE — 80048 BASIC METABOLIC PNL TOTAL CA: CPT

## 2022-07-01 ENCOUNTER — CARE COORDINATION (OUTPATIENT)
Dept: OTHER | Facility: CLINIC | Age: 57
End: 2022-07-01

## 2022-07-01 NOTE — CARE COORDINATION
[facility] Trans[facility] Follow Up Call    2022    Patient: Khurram Moon  Patient : 1965   MRN: [id number]  Reason for Admission: Diverticulitis/ DKA  Discharge Date: 22 RARS: Readmission Risk Score: 5.3 ( )      Care Transitions Subsequent and Final Call    Schedule Follow Up Appointment with PCP: Completed  Subsequent and Final Calls  Do you have any ongoing symptoms?: No  Have your medications changed?: No  Do you have any questions related to your medications?: No  Do you currently have any active services?: No  Do you have any needs or concerns that I can assist you with?: No  Identified Barriers: None  Care Transitions Interventions     Other Services: Completed (Comment: Home Health Care/ IV antibiotics)   Other Interventions:         Associate Care Manager (ACM) contacted the patient by telephone to follow up on progress, reinforce previous education, and discuss any new issues or concerns. Verified name and  with patient as identifiers. ACM reviewed discharge instructions, medical action plan and red flags with patient and discussed any barriers to care and/or understanding of plan of care after discharge. Discussed appropriate site of care based on symptoms and resources available to patient including: PCP  Specialist  Benefits related nurse triage line  Urgent care clinics  MyChart Messaging. The patient agrees to contact the PCP office for questions related to their healthcare. Patient states he is doing well since last contact. He dd have her lab work completed this week and is to have follow up labs next week. He has completed his follow up appt with his PCP, GI is to see him in 6 month and ID will see him as needed. He denies any new issues, concerns or immediate ACM needs at is time. Reinforced importance and benefits of provider follow up, medications, and diagnostic testing compliance. Pt verbalized understanding and is agreeable to follow up contact. Education and Interventions provided   Red Flag symptoms to report  Symptom management  Medication compliance  Provider follow up appointment compliance  Specialist appointment compliance  Utilization of appropriate level of care: Right Care, Right Place, Right Time      ACM provided contact information for future needs. Plan for follow-up call in 10-14 days based on severity of symptoms and risk factors. Plan for next call:   Provider follow up appointment compliance  Utilization of appropriate level of care  Discuss Plan of Care  Assess for Ongoing Needs    Follow Up  No future appointments.     Kg Yi RN

## 2022-07-05 ENCOUNTER — HOSPITAL ENCOUNTER (OUTPATIENT)
Age: 57
Discharge: HOME | End: 2022-07-05
Payer: COMMERCIAL

## 2022-07-05 LAB
ANION GAP SERPL CALCULATED.3IONS-SCNC: 13 MMOL/L (ref 7–16)
BASOPHILS ABSOLUTE: 0.04 E9/L (ref 0–0.2)
BASOPHILS RELATIVE PERCENT: 0.5 % (ref 0–2)
BUN BLDV-MCNC: 19 MG/DL (ref 6–20)
CALCIUM SERPL-MCNC: 9.2 MG/DL (ref 8.6–10.2)
CHLORIDE BLD-SCNC: 105 MMOL/L (ref 98–107)
CO2: 22 MMOL/L (ref 22–29)
CREAT SERPL-MCNC: 1.2 MG/DL (ref 0.7–1.2)
EOSINOPHILS ABSOLUTE: 0.18 E9/L (ref 0.05–0.5)
EOSINOPHILS RELATIVE PERCENT: 2.1 % (ref 0–6)
GFR AFRICAN AMERICAN: >60
GFR NON-AFRICAN AMERICAN: >60 ML/MIN/1.73
GLUCOSE BLD-MCNC: 153 MG/DL (ref 74–99)
HCT VFR BLD CALC: 40 % (ref 37–54)
HEMOGLOBIN: 13.1 G/DL (ref 12.5–16.5)
IMMATURE GRANULOCYTES #: 0.06 E9/L
IMMATURE GRANULOCYTES %: 0.7 % (ref 0–5)
LYMPHOCYTES ABSOLUTE: 2.03 E9/L (ref 1.5–4)
LYMPHOCYTES RELATIVE PERCENT: 23.2 % (ref 20–42)
MAGNESIUM: 2.4 MG/DL (ref 1.6–2.6)
MCH RBC QN AUTO: 28.9 PG (ref 26–35)
MCHC RBC AUTO-ENTMCNC: 32.8 % (ref 32–34.5)
MCV RBC AUTO: 88.3 FL (ref 80–99.9)
MONOCYTES ABSOLUTE: 0.49 E9/L (ref 0.1–0.95)
MONOCYTES RELATIVE PERCENT: 5.6 % (ref 2–12)
NEUTROPHILS ABSOLUTE: 5.94 E9/L (ref 1.8–7.3)
NEUTROPHILS RELATIVE PERCENT: 67.9 % (ref 43–80)
PDW BLD-RTO: 12.8 FL (ref 11.5–15)
PHOSPHORUS: 3.2 MG/DL (ref 2.5–4.5)
PLATELET # BLD: 430 E9/L (ref 130–450)
PMV BLD AUTO: 9.7 FL (ref 7–12)
POTASSIUM SERPL-SCNC: 3.9 MMOL/L (ref 3.5–5)
RBC # BLD: 4.53 E12/L (ref 3.8–5.8)
SODIUM BLD-SCNC: 140 MMOL/L (ref 132–146)
WBC # BLD: 8.7 E9/L (ref 4.5–11.5)

## 2022-07-05 PROCEDURE — 80048 BASIC METABOLIC PNL TOTAL CA: CPT

## 2022-07-05 PROCEDURE — 84100 ASSAY OF PHOSPHORUS: CPT

## 2022-07-05 PROCEDURE — 85025 COMPLETE CBC W/AUTO DIFF WBC: CPT

## 2022-07-05 PROCEDURE — 36415 COLL VENOUS BLD VENIPUNCTURE: CPT

## 2022-07-05 PROCEDURE — 83735 ASSAY OF MAGNESIUM: CPT

## 2022-07-13 ENCOUNTER — CARE COORDINATION (OUTPATIENT)
Dept: OTHER | Facility: CLINIC | Age: 57
End: 2022-07-13

## 2022-07-13 NOTE — CARE COORDINATION
[facility] Trans[facility] Follow Up Call    2022    Patient: Cyril Rodriguez  Patient : 1965   MRN: [id number]  Reason for Admission: Diverticulitis  Discharge Date: 22 RARS: Readmission Risk Score: 5.3 ( )      Care Transitions Subsequent and Final Call    Schedule Follow Up Appointment with PCP: Completed  Subsequent and Final Calls  Do you have any ongoing symptoms?: No  Have your medications changed?: No  Do you have any questions related to your medications?: No  Do you currently have any active services?: Yes  Are you currently active with any services?: Home Health  Do you have any needs or concerns that I can assist you with?: No  Identified Barriers: None  Care Transitions Interventions     Other Services: Completed (Comment: Home Health Care/ IV antibiotics)     Specialty Service Referral: Completed    Other Interventions:         Associate Care Manager (ACM) contacted the patient by telephone to follow up on progress, reinforce previous education, and discuss any new issues or concerns. Verified name and  with patient as identifiers. ACM reviewed discharge instructions, medical action plan and red flags with patient and discussed any barriers to care and/or understanding of plan of care after discharge. Discussed appropriate site of care based on symptoms and resources available to patient including: PCP  Specialist  Benefits related nurse triage line  Urgent care clinics  Home health  [employer] Messaging. The patient agrees to contact the PCP office for questions related to their healthcare. Patient states that he continues to improve and is doing well at this time. He will be completing his antibiotic therapy on 22.   He has completed his follow up appt and denies any new issue or concerns or ongoing ACM needs a this time     Education Provided/ Reinforced:   Symptom management  Medication compliance  Specialist appointment compliance  Utilization of appropriate level of care: Right Care, Right Place, Right Time      No further outreach scheduled with this ACM, ACM will sign off care team at this time. Episode of Care resolved. Patient has this ACM's contact information if future needs arise.       Follow Up  Future Appointments   Date Time Provider Belen Abreu   10/20/2022  9:00 AM SAGRARIO Hdez - [facility]       Ruthann Tate RN

## 2023-03-07 ENCOUNTER — HOSPITAL ENCOUNTER (OUTPATIENT)
Dept: CT IMAGING | Age: 58
Discharge: HOME | End: 2023-03-09
Payer: COMMERCIAL

## 2023-03-07 VITALS
TEMPERATURE: 97.8 F | OXYGEN SATURATION: 97 % | RESPIRATION RATE: 17 BRPM | HEART RATE: 65 BPM | WEIGHT: 230 LBS | HEIGHT: 74 IN | SYSTOLIC BLOOD PRESSURE: 114 MMHG | BODY MASS INDEX: 29.52 KG/M2 | DIASTOLIC BLOOD PRESSURE: 77 MMHG

## 2023-03-07 DIAGNOSIS — R07.9 CHEST PAIN, UNSPECIFIED TYPE: ICD-10-CM

## 2023-03-07 DIAGNOSIS — R09.89 OTHER SPECIFIED SYMPTOMS AND SIGNS INVOLVING THE CIRCULATORY AND RESPIRATORY SYSTEMS: ICD-10-CM

## 2023-03-07 DIAGNOSIS — R06.00 DYSPNEA, UNSPECIFIED TYPE: ICD-10-CM

## 2023-03-07 PROCEDURE — 2580000003 HC RX 258: Performed by: INTERNAL MEDICINE

## 2023-03-07 PROCEDURE — 7100000011 HC PHASE II RECOVERY - ADDTL 15 MIN

## 2023-03-07 PROCEDURE — 6360000004 HC RX CONTRAST MEDICATION: Performed by: RADIOLOGY

## 2023-03-07 PROCEDURE — 75574 CT ANGIO HRT W/3D IMAGE: CPT | Performed by: INTERNAL MEDICINE

## 2023-03-07 PROCEDURE — 6370000000 HC RX 637 (ALT 250 FOR IP): Performed by: INTERNAL MEDICINE

## 2023-03-07 PROCEDURE — 75574 CT ANGIO HRT W/3D IMAGE: CPT

## 2023-03-07 PROCEDURE — 36415 COLL VENOUS BLD VENIPUNCTURE: CPT

## 2023-03-07 PROCEDURE — 7100000010 HC PHASE II RECOVERY - FIRST 15 MIN

## 2023-03-07 PROCEDURE — 96365 THER/PROPH/DIAG IV INF INIT: CPT

## 2023-03-07 RX ORDER — METOPROLOL TARTRATE 50 MG/1
100 TABLET, FILM COATED ORAL ONCE
Status: DISCONTINUED | OUTPATIENT
Start: 2023-03-07 | End: 2023-03-10 | Stop reason: HOSPADM

## 2023-03-07 RX ORDER — METOPROLOL TARTRATE 5 MG/5ML
5 INJECTION INTRAVENOUS PRN
Status: DISCONTINUED | OUTPATIENT
Start: 2023-03-07 | End: 2023-03-10 | Stop reason: HOSPADM

## 2023-03-07 RX ORDER — SODIUM CHLORIDE 0.9 % (FLUSH) 0.9 %
10 SYRINGE (ML) INJECTION
Status: DISPENSED | OUTPATIENT
Start: 2023-03-07 | End: 2023-03-08

## 2023-03-07 RX ORDER — NITROGLYCERIN 0.4 MG/1
0.8 TABLET SUBLINGUAL ONCE
Status: COMPLETED | OUTPATIENT
Start: 2023-03-07 | End: 2023-03-07

## 2023-03-07 RX ORDER — METOPROLOL TARTRATE 50 MG/1
50 TABLET, FILM COATED ORAL ONCE
Status: COMPLETED | OUTPATIENT
Start: 2023-03-07 | End: 2023-03-07

## 2023-03-07 RX ORDER — 0.9 % SODIUM CHLORIDE 0.9 %
1000 INTRAVENOUS SOLUTION INTRAVENOUS ONCE
Status: COMPLETED | OUTPATIENT
Start: 2023-03-07 | End: 2023-03-07

## 2023-03-07 RX ADMIN — METOPROLOL TARTRATE 50 MG: 50 TABLET, FILM COATED ORAL at 08:00

## 2023-03-07 RX ADMIN — SODIUM CHLORIDE 1000 ML: 9 INJECTION, SOLUTION INTRAVENOUS at 07:56

## 2023-03-07 RX ADMIN — NITROGLYCERIN 0.8 MG: 0.4 TABLET SUBLINGUAL at 09:19

## 2023-03-07 RX ADMIN — IOPAMIDOL 70 ML: 755 INJECTION, SOLUTION INTRAVENOUS at 09:09

## 2023-03-07 ASSESSMENT — PAIN - FUNCTIONAL ASSESSMENT: PAIN_FUNCTIONAL_ASSESSMENT: NONE - DENIES PAIN

## 2023-03-07 NOTE — PROCEDURES
0930 return from coronary CTA. Pt tolerated well, CT tech reported good scan achieved. Transported pt back to [facility] Will obtain VS, establish met discharge criteria.

## 2023-03-07 NOTE — DISCHARGE INSTRUCTIONS
Because we gave you a strong dose of beta blocker today for your exam, Hold 5mg Bystolic today (2[birth year]) then you may resume taking it tomorrow Wednesday 3/8. Because of the amount of contrast we had to use today to complete your scan, please hold your Metformin for 2 days, you mat resume taking it Thursday 3/9. Drink extra fluids for the next 2 days to help flush the contrast out of your body and prevent kidney damage. Results can take 3 to 5 business days. Please call your doctor to schedule your follow-up appointment to obtain your results.

## 2023-03-07 NOTE — PROCEDURES
Patient arrived for a Coronary CTA with wife at the bedside, Kimberlyn Torres [phone].   Patient arrived via ambulation with wife to Radiology department for Coronary CTA. Allergies, home medications, H&P and fasting instructions reviewed with patient. Patient placed on continuous heart monitor, frequent vitals to assess heart rate and blood pressure parameters. 20 gauge IV placed,  IV flushed and prn adapter attached. Procedural instructions given, questions answered, understanding expressed. Emotional support given. Dr. Rodríguez is aware that patient is currently in Atrial Fibrillation.   0800 first drug administration. Patient resting. Dr. Rodríguez advised to call back and update him at 0845.   0845 spoke with Dr. Rodríguez, refer to MAR for PRN orders, and orders while in CT room.   Called CT to give them a heads up for patient coming over.     Refer to MAR for orders, and refer to flowsheets for vitals.

## 2024-11-08 ENCOUNTER — HOSPITAL ENCOUNTER (OUTPATIENT)
Age: 59
Discharge: HOME | End: 2024-11-10

## 2024-11-19 ENCOUNTER — HOSPITAL ENCOUNTER (OUTPATIENT)
Dept: MRI IMAGING | Age: 59
Discharge: HOME | End: 2024-11-21
Attending: FAMILY MEDICINE

## 2024-11-19 DIAGNOSIS — R52 PAIN: ICD-10-CM

## 2024-11-19 PROCEDURE — 72195 MRI PELVIS W/O DYE: CPT

## 2025-02-12 RX ORDER — LINAGLIPTIN AND METFORMIN HYDROCHLORIDE 2.5; 1 MG/1; MG/1
1 TABLET, FILM COATED ORAL DAILY
COMMUNITY

## 2025-02-12 NOTE — PROGRESS NOTES
Pt stated he does not have pre procedure dosing instructions from DR Quiros for procedure on 2/19/25. Pt instructed to call Dr Jackson's office for dosing instructions. Message left for Clementina at Dr Jackson's office that pt needs dosing instructions of Xarelto.

## 2025-02-12 NOTE — PROGRESS NOTES
[facility] PRE-ADMISSION TESTING INSTRUCTIONS    The Preadmission Testing patient is instructed accordingly using the following criteria (check applicable):    ARRIVAL INSTRUCTIONS:  [x] Parking the day of Surgery is located in the Main Entrance lot.  Upon entering the door, make an immediate right to the surgery reception desk    [x] Bring photo ID and insurance card    [x] Bring in a copy of Living will or Durable Power of  papers.    [x] Please be sure to arrange for responsible adult to provide transportation to and from the hospital    [x] Please arrange for responsible adult to be with you for the 24 hour period post procedure due to having anesthesia    [x] If you awake am of surgery not feeling well or have temperature >100 please call [phone]    GENERAL INSTRUCTIONS:    [x]No solid food to eat after midnight.  May have clear liquids until 4 hours prior to surgery. Examples include water, fruit juices (no pulp), jello, popsicles, black coffee or tea, . 8 ounces . No milk products               No gum, candy or mints.    [x] You may brush your teeth, but do not swallow any water    [x] Take medications as instructed with 1-2 oz of water    [x] Stop herbal supplements and vitamins 5 days prior to procedure    [x] Follow preop dosing of blood thinners per physician instructions. Call Dr Jackson's office for pre procedure dosing instructions for Xarelto.    [] Take 1/2 dose of evening insulin, but no insulin after midnight    [] No oral diabetic medications after midnight    [] If diabetic and have low blood sugar or feel symptomatic, take 1-2oz apple juice only    [] Bring inhalers day of surgery    [] Bring C-PAP/ Bi-Pap day of surgery    [] Bring urine specimen day of surgery    [x] Shower or bath with soap, lather and rinse well, AM of Surgery, no lotion, powders or creams to surgical site    [] Follow bowel prep as instructed per surgeon    [x] No tobacco products

## 2025-02-18 ENCOUNTER — PREP FOR PROCEDURE (OUTPATIENT)
Dept: UROLOGY | Age: 60
End: 2025-02-18

## 2025-02-18 RX ORDER — SODIUM CHLORIDE 9 MG/ML
INJECTION, SOLUTION INTRAVENOUS CONTINUOUS
Status: CANCELLED | OUTPATIENT
Start: 2025-02-18

## 2025-02-18 RX ORDER — SODIUM CHLORIDE 0.9 % (FLUSH) 0.9 %
5-40 SYRINGE (ML) INJECTION EVERY 12 HOURS SCHEDULED
Status: CANCELLED | OUTPATIENT
Start: 2025-02-18

## 2025-02-18 RX ORDER — SODIUM CHLORIDE 0.9 % (FLUSH) 0.9 %
5-40 SYRINGE (ML) INJECTION PRN
Status: CANCELLED | OUTPATIENT
Start: 2025-02-18

## 2025-02-18 RX ORDER — SODIUM CHLORIDE 9 MG/ML
INJECTION, SOLUTION INTRAVENOUS PRN
Status: CANCELLED | OUTPATIENT
Start: 2025-02-18

## 2025-02-19 ENCOUNTER — HOSPITAL ENCOUNTER (OUTPATIENT)
Age: 60
Setting detail: OUTPATIENT SURGERY
Discharge: HOME | End: 2025-02-19
Attending: STUDENT IN AN ORGANIZED HEALTH CARE EDUCATION/TRAINING PROGRAM | Admitting: STUDENT IN AN ORGANIZED HEALTH CARE EDUCATION/TRAINING PROGRAM
Payer: COMMERCIAL

## 2025-02-19 ENCOUNTER — ANESTHESIA (OUTPATIENT)
Dept: OPERATING ROOM | Age: 60
End: 2025-02-19
Payer: COMMERCIAL

## 2025-02-19 ENCOUNTER — ANESTHESIA EVENT (OUTPATIENT)
Dept: OPERATING ROOM | Age: 60
End: 2025-02-19
Payer: COMMERCIAL

## 2025-02-19 VITALS
BODY MASS INDEX: 29.59 KG/M2 | SYSTOLIC BLOOD PRESSURE: 109 MMHG | TEMPERATURE: 98.5 F | HEIGHT: 75 IN | OXYGEN SATURATION: 97 % | HEART RATE: 69 BPM | WEIGHT: 238 LBS | DIASTOLIC BLOOD PRESSURE: 77 MMHG | RESPIRATION RATE: 19 BRPM

## 2025-02-19 DIAGNOSIS — R97.20 ELEVATED PROSTATE SPECIFIC ANTIGEN (PSA): ICD-10-CM

## 2025-02-19 PROCEDURE — 6360000002 HC RX W HCPCS: Performed by: NURSE ANESTHETIST, CERTIFIED REGISTERED

## 2025-02-19 PROCEDURE — 7100000011 HC PHASE II RECOVERY - ADDTL 15 MIN: Performed by: STUDENT IN AN ORGANIZED HEALTH CARE EDUCATION/TRAINING PROGRAM

## 2025-02-19 PROCEDURE — 2500000003 HC RX 250 WO HCPCS: Performed by: NURSE PRACTITIONER

## 2025-02-19 PROCEDURE — 6360000002 HC RX W HCPCS: Performed by: STUDENT IN AN ORGANIZED HEALTH CARE EDUCATION/TRAINING PROGRAM

## 2025-02-19 PROCEDURE — 3600000002 HC SURGERY LEVEL 2 BASE: Performed by: STUDENT IN AN ORGANIZED HEALTH CARE EDUCATION/TRAINING PROGRAM

## 2025-02-19 PROCEDURE — 88305 TISSUE EXAM BY PATHOLOGIST: CPT

## 2025-02-19 PROCEDURE — 3700000001 HC ADD 15 MINUTES (ANESTHESIA): Performed by: STUDENT IN AN ORGANIZED HEALTH CARE EDUCATION/TRAINING PROGRAM

## 2025-02-19 PROCEDURE — 6360000002 HC RX W HCPCS: Performed by: NURSE PRACTITIONER

## 2025-02-19 PROCEDURE — 2709999900 HC NON-CHARGEABLE SUPPLY: Performed by: STUDENT IN AN ORGANIZED HEALTH CARE EDUCATION/TRAINING PROGRAM

## 2025-02-19 PROCEDURE — 2580000003 HC RX 258: Performed by: NURSE PRACTITIONER

## 2025-02-19 PROCEDURE — 7100000010 HC PHASE II RECOVERY - FIRST 15 MIN: Performed by: STUDENT IN AN ORGANIZED HEALTH CARE EDUCATION/TRAINING PROGRAM

## 2025-02-19 PROCEDURE — 3600000012 HC SURGERY LEVEL 2 ADDTL 15MIN: Performed by: STUDENT IN AN ORGANIZED HEALTH CARE EDUCATION/TRAINING PROGRAM

## 2025-02-19 PROCEDURE — 3700000000 HC ANESTHESIA ATTENDED CARE: Performed by: STUDENT IN AN ORGANIZED HEALTH CARE EDUCATION/TRAINING PROGRAM

## 2025-02-19 PROCEDURE — 88344 IMHCHEM/IMCYTCHM EA MLT ANTB: CPT

## 2025-02-19 RX ORDER — MIDAZOLAM HYDROCHLORIDE 1 MG/ML
INJECTION, SOLUTION INTRAMUSCULAR; INTRAVENOUS
Status: DISCONTINUED | OUTPATIENT
Start: 2025-02-19 | End: 2025-02-19 | Stop reason: SDUPTHER

## 2025-02-19 RX ORDER — SODIUM CHLORIDE 0.9 % (FLUSH) 0.9 %
5-40 SYRINGE (ML) INJECTION PRN
Status: CANCELLED | OUTPATIENT
Start: 2025-02-19

## 2025-02-19 RX ORDER — LIDOCAINE HYDROCHLORIDE 20 MG/ML
INJECTION, SOLUTION INFILTRATION; PERINEURAL
Status: DISCONTINUED | OUTPATIENT
Start: 2025-02-19 | End: 2025-02-19 | Stop reason: SDUPTHER

## 2025-02-19 RX ORDER — SODIUM CHLORIDE 9 MG/ML
INJECTION, SOLUTION INTRAVENOUS PRN
Status: CANCELLED | OUTPATIENT
Start: 2025-02-19

## 2025-02-19 RX ORDER — PROPOFOL 10 MG/ML
INJECTION, EMULSION INTRAVENOUS
Status: DISCONTINUED | OUTPATIENT
Start: 2025-02-19 | End: 2025-02-19 | Stop reason: SDUPTHER

## 2025-02-19 RX ORDER — HYDRALAZINE HYDROCHLORIDE 20 MG/ML
10 INJECTION INTRAMUSCULAR; INTRAVENOUS
Status: CANCELLED | OUTPATIENT
Start: 2025-02-19

## 2025-02-19 RX ORDER — MEPERIDINE HYDROCHLORIDE 50 MG/ML
12.5 INJECTION INTRAMUSCULAR; INTRAVENOUS; SUBCUTANEOUS EVERY 5 MIN PRN
Status: CANCELLED | OUTPATIENT
Start: 2025-02-19

## 2025-02-19 RX ORDER — ONDANSETRON 2 MG/ML
4 INJECTION INTRAMUSCULAR; INTRAVENOUS
Status: CANCELLED | OUTPATIENT
Start: 2025-02-19 | End: 2025-02-20

## 2025-02-19 RX ORDER — NALOXONE HYDROCHLORIDE 0.4 MG/ML
INJECTION, SOLUTION INTRAMUSCULAR; INTRAVENOUS; SUBCUTANEOUS PRN
Status: CANCELLED | OUTPATIENT
Start: 2025-02-19

## 2025-02-19 RX ORDER — SODIUM CHLORIDE 9 MG/ML
INJECTION, SOLUTION INTRAVENOUS PRN
Status: DISCONTINUED | OUTPATIENT
Start: 2025-02-19 | End: 2025-02-19 | Stop reason: HOSPADM

## 2025-02-19 RX ORDER — LABETALOL HYDROCHLORIDE 5 MG/ML
10 INJECTION, SOLUTION INTRAVENOUS
Status: CANCELLED | OUTPATIENT
Start: 2025-02-19

## 2025-02-19 RX ORDER — SODIUM CHLORIDE 0.9 % (FLUSH) 0.9 %
5-40 SYRINGE (ML) INJECTION PRN
Status: DISCONTINUED | OUTPATIENT
Start: 2025-02-19 | End: 2025-02-19 | Stop reason: HOSPADM

## 2025-02-19 RX ORDER — SODIUM CHLORIDE 9 MG/ML
INJECTION, SOLUTION INTRAVENOUS CONTINUOUS
Status: DISCONTINUED | OUTPATIENT
Start: 2025-02-19 | End: 2025-02-19 | Stop reason: HOSPADM

## 2025-02-19 RX ORDER — IPRATROPIUM BROMIDE AND ALBUTEROL SULFATE 2.5; .5 MG/3ML; MG/3ML
1 SOLUTION RESPIRATORY (INHALATION)
Status: CANCELLED | OUTPATIENT
Start: 2025-02-19 | End: 2025-02-20

## 2025-02-19 RX ORDER — BUPIVACAINE HYDROCHLORIDE 5 MG/ML
INJECTION, SOLUTION EPIDURAL; INTRACAUDAL PRN
Status: DISCONTINUED | OUTPATIENT
Start: 2025-02-19 | End: 2025-02-19 | Stop reason: ALTCHOICE

## 2025-02-19 RX ORDER — SODIUM CHLORIDE 0.9 % (FLUSH) 0.9 %
5-40 SYRINGE (ML) INJECTION EVERY 12 HOURS SCHEDULED
Status: DISCONTINUED | OUTPATIENT
Start: 2025-02-19 | End: 2025-02-19 | Stop reason: HOSPADM

## 2025-02-19 RX ORDER — MIDAZOLAM HYDROCHLORIDE 2 MG/2ML
2 INJECTION, SOLUTION INTRAMUSCULAR; INTRAVENOUS
Status: CANCELLED | OUTPATIENT
Start: 2025-02-19 | End: 2025-02-20

## 2025-02-19 RX ORDER — SODIUM CHLORIDE 0.9 % (FLUSH) 0.9 %
5-40 SYRINGE (ML) INJECTION EVERY 12 HOURS SCHEDULED
Status: CANCELLED | OUTPATIENT
Start: 2025-02-19

## 2025-02-19 RX ADMIN — WATER 2000 MG: 1 INJECTION INTRAMUSCULAR; INTRAVENOUS; SUBCUTANEOUS at 10:20

## 2025-02-19 RX ADMIN — SODIUM CHLORIDE: 9 INJECTION, SOLUTION INTRAVENOUS at 09:45

## 2025-02-19 RX ADMIN — LIDOCAINE HYDROCHLORIDE 40 MG: 20 INJECTION, SOLUTION INFILTRATION; PERINEURAL at 10:25

## 2025-02-19 RX ADMIN — MIDAZOLAM 2 MG: 1 INJECTION INTRAMUSCULAR; INTRAVENOUS at 10:19

## 2025-02-19 RX ADMIN — PROPOFOL 150 MCG/KG/MIN: 10 INJECTION, EMULSION INTRAVENOUS at 10:26

## 2025-02-19 RX ADMIN — PROPOFOL 20 MG: 10 INJECTION, EMULSION INTRAVENOUS at 10:31

## 2025-02-19 RX ADMIN — PROPOFOL 80 MG: 10 INJECTION, EMULSION INTRAVENOUS at 10:25

## 2025-02-19 NOTE — H&P
Marvin Torres is a 59 y.o. male with elevated PSA   Here for fusion biopsy   No new changes.       Past Medical History:   Diagnosis Date    Diabetes mellitus (HCC)     Diverticulosis     Hyperlipidemia     Paroxysmal A-fib (HCC)     Sleep apnea     does not wear cpap       Past Surgical History:   Procedure Laterality Date    CARDIOVERSION  2017    COLONOSCOPY      PICC LINE INSERTION NURSE  06/27/2022    since removed       Family History   Problem Relation Age of Onset    Diabetes Mother     Heart Disease Mother     Other Father         glaucoma     Cancer Father         prostate     No Known Problems Brother     No Known Problems Brother        Prior to Admission medications    Medication Sig Start Date End Date Taking? Authorizing Provider   linagliptin-metFORMIN (JENTADUETO) 2.5-1000 MG TABS Take 1 capsule by mouth daily   Yes Elif Chong MD   nebivolol (BYSTOLIC) 5 MG tablet Take 1 tablet by mouth daily   Yes Elif Chong MD   Ertugliflozin-metFORMIN HCl (SEGLUROMET) 7.5-1000 MG TABS Take 1 tablet by mouth daily  Patient not taking: Reported on 2/12/2025    Elif Chong MD   Bempedoic Acid-Ezetimibe (NEXLIZET) 180-10 MG TABS Take 1 tablet by mouth daily    Elif Chong MD   CPAP Machine MISC by Does not apply route  Patient not taking: Reported on 3/7/2023    Elif Chong MD   rivaroxaban (XARELTO) 20 MG TABS tablet Take 1 tablet by mouth    Elif Chong MD        Allergies: Sulfate    Social History     Tobacco Use    Smoking status: Never    Smokeless tobacco: Never   Substance Use Topics    Alcohol use: Yes     Comment: every other day- beer        Review of Systems:  Respiratory: negative for cough and hemoptysis  Cardiovascular: negative for chest pain and dyspnea  Gastrointestinal: negative for abdominal pain, diarrhea, nausea and vomiting  Genitourinary: as per HPI, otherwise negative.  Derm: negative for rash and skin  lesion(s)  Neurological: negative for seizures and tremors  Endocrine: negative for diabetic symptoms including polydipsia and polyuria  All other systems negative    Physical Exam:  Vitals:    02/19/25 0914   BP: 138/82   Pulse: 64   Resp: 15   Temp: 98.1 °F (36.7 °C)   SpO2: 95%      Skin:  Warm and dry.  No rash or bruises  HEENT:  PERRLA, EOMI  Neck:  No JVD, No thyromegaly  Cardiac:  RRR  Lungs:  No audible wheezes, symmetric respirations, non-labored  Abdomen: Soft, non-tender, non-distended  Extremities:  No clubbing, edema or cyanosis  Neurological:  Moves all extremities, normal DTR    Lab Results   Component Value Date    WBC 8.7 07/05/2022    HGB 13.1 07/05/2022    HCT 40.0 07/05/2022    MCV 88.3 07/05/2022     07/05/2022       Lab Results   Component Value Date    CREATININE 1.2 07/05/2022       No results found for: \"PSA\"    No results for input(s): \"LABURIN\" in the last 72 hours.    No results for input(s): \"BC\" in the last 72 hours.    No results for input(s): \"BLOODCULT2\" in the last 72 hours.    Assessment and Plan:  1. Fusion biopsy

## 2025-02-19 NOTE — ANESTHESIA POSTPROCEDURE EVALUATION
Department of Anesthesiology  Postprocedure Note    Patient: Marvin Torres  MRN: [id number]  YOB: 1965  Date of evaluation: 2/19/2025    Procedure Summary       Date: 02/19/25 Room / Location: [address]    Anesthesia Start: 1018 Anesthesia Stop:     Procedure: TRANSPERINEAL ULTRASOUND GUIDED PROSTATE BIOPSY (Perineum) Diagnosis:       Elevated prostate specific antigen (PSA)      (Elevated prostate specific antigen (PSA) [R97.20])    Surgeons: Alejo Jackson MD Responsible Provider: Sonia May DO    Anesthesia Type: MAC ASA Status: 3            Anesthesia Type: No value filed.    Bari Phase I: Bari Score: 10    Bari Phase II:      Anesthesia Post Evaluation    Patient location during evaluation: PACU  Patient participation: complete - patient participated  Level of consciousness: awake  Airway patency: patent  Nausea & Vomiting: no nausea and no vomiting  Cardiovascular status: hemodynamically stable  Respiratory status: acceptable  Hydration status: euvolemic  Pain management: adequate        No notable events documented.

## 2025-02-19 NOTE — ANESTHESIA PRE PROCEDURE
Department of Anesthesiology  Preprocedure Note       Name:  Marvin Torres   Age:  59 y.o.  :  1965                                          MRN:  [id number]         Date:  2025      Surgeon: Surgeon(s):  Alejo Jackson MD    Procedure: Procedure(s):  TRANSPERINEAL ULTRASOUND GUIDED PROSTATE BIOPSY    Medications prior to admission:   Prior to Admission medications    Medication Sig Start Date End Date Taking? Authorizing Provider   linagliptin-metFORMIN (JENTADUETO) 2.5-1000 MG TABS Take 1 capsule by mouth daily   Yes Elif Chong MD   nebivolol (BYSTOLIC) 5 MG tablet Take 1 tablet by mouth daily   Yes Elif Chong MD   Ertugliflozin-metFORMIN HCl (SEGLUROMET) 7.5-1000 MG TABS Take 1 tablet by mouth daily  Patient not taking: Reported on 2025    Elif Chong MD   Bempedoic Acid-Ezetimibe (NEXLIZET) 180-10 MG TABS Take 1 tablet by mouth daily    Elif Chong MD   CPAP Machine MISC by Does not apply route  Patient not taking: Reported on 3/7/2023    Elif Chong MD   rivaroxaban (XARELTO) 20 MG TABS tablet Take 1 tablet by mouth    Elif Chong MD       Current medications:    Current Facility-Administered Medications   Medication Dose Route Frequency Provider Last Rate Last Admin    0.9 % sodium chloride infusion   IntraVENous Continuous Ifeoma Castillo APRN - CNP        sodium chloride flush 0.9 % injection 5-40 mL  5-40 mL IntraVENous 2 times per day Ifeoma Casitllo APRN - CNP        sodium chloride flush 0.9 % injection 5-40 mL  5-40 mL IntraVENous PRN Ifeoma Castillo APRN - CNP        0.9 % sodium chloride infusion   IntraVENous PRN Ifeoma Castillo APRN - CNP        ceFAZolin (ANCEF) 2,000 mg in sterile water 20 mL IV syringe  2,000 mg IntraVENous On Call to OR Ifeoma Castillo APRN - CNP           Allergies:    Allergies   Allergen Reactions    Sulfate Hives       Problem List:    Patient Active Problem List   Diagnosis 
Statement Selected

## 2025-02-19 NOTE — OP NOTE
Operative Note      Patient: Marvin Torres  YOB: 1965  MRN: [id number]    Date of Procedure: 2/19/2025    Pre-Op Diagnosis Codes:      * Elevated prostate specific antigen (PSA) [R97.20]    Post-Op Diagnosis: Same       Procedure(s):  TRANSPERINEAL ULTRASOUND GUIDED PROSTATE BIOPSY    Surgeon(s):  Alejo Jackson MD    Assistant:   * No surgical staff found *    Anesthesia: Monitor Anesthesia Care    Estimated Blood Loss (mL): Minimal    Complications: None    Specimens:   ID Type Source Tests Collected by Time Destination   A : RIGHT ANTERIOR APEX BIOPSY Tissue Prostate SURGICAL PATHOLOGY Alejo Jackson MD 2/19/2025 1003    B : RIGHT ANTERIOR BASE PIOPSY Tissue Prostate SURGICAL PATHOLOGY Alejo Jackson MD 2/19/2025 1003    C : RIGHT POSTERIOR APEX BIOPSY Tissue Prostate SURGICAL PATHOLOGY Alejo Jackson MD 2/19/2025 1003    D : RIGHT POSTERIOR BASE BIOPSY Tissue Prostate SURGICAL PATHOLOGY Alejo Jackson MD 2/19/2025 1003    E : RIGHT LATERAL BIOPSY Tissue Prostate SURGICAL PATHOLOGY Alejo Jackson MD 2/19/2025 1003    F : MIDLINE APEX BIOPSY Tissue Prostate SURGICAL PATHOLOGY Alejo Jackson MD 2/19/2025 1003    G : MIDLINE BASE BIOPSY Tissue Prostate SURGICAL PATHOLOGY Alejo Jackson MD 2/19/2025 1003    H : LEFT ANTERIOR APEX BIOPSY Tissue Prostate SURGICAL PATHOLOGY Alejo Jackson MD 2/19/2025 1003    I : LEFT ANTERIOR BASE BIOPSY Tissue Prostate SURGICAL PATHOLOGY Alejo Jackson MD 2/19/2025 1003    J : LEFT POSTERIOR APEX BIOPSY Tissue Prostate SURGICAL PATHOLOGY Alejo Jackson MD 2/19/2025 1003    K : LEFT POSTERIOR BASE BIOPSY Tissue Prostate SURGICAL PATHOLOGY Alejo Jackson MD 2/19/2025 1003    L : LEFT LATERAL BIOPSY Tissue Prostate SURGICAL PATHOLOGY Alejo Jackson MD 2/19/2025 1003    M : LESION 1 Tissue Tissue SURGICAL PATHOLOGY Alejo Jackson MD 2/19/2025 1035        Implants:  * No implants in log *      Drains: * No LDAs found *    Findings:  Infection Present At Time Of Surgery  (PATOS) (choose all levels that have infection present):  No infection present  Other Findings: see opnote    Detailed Description of Procedure:     Patient is a 59-year-old male who was found to have an elevated PSA in the office.  All options were given to the patient.  He did undergo an MRI of his prostate.  He is then elected to undergo MRI fusion biopsy in the transperineal approach.  All risk benefits and alternatives were discussed with the patient informed consent was obtained and signed.    Operation:    Patient was wheeled back in the operative suite.  Upon entering the operative suite the patient identified using name band and number.  The patient was transferred the operative table and placed in supine position.  Patient was then placed in the dorsolithotomy position prepped and draped in usual sterile fashion.  At that time attention was given to the perineum where this was completely prepped and ready for biopsy.  An ultrasound probe was then inserted into the rectum atraumatically.  We identified the prostate under ultrasound guidance.    The ultrasound was then measured in a three-dimensional fashion.  Ultrasound volume of the prostate was found to be 65 grams.    At that time we then did a dorsal prostatic block.  As well as numbing all skin for where the biopsy would be taken.    We then used the ultrasound along with the MRI guidance diffuse the 2 images together to provide us the imaging needed for biopsy.    Attention was given to the MRI guided PI-RADS lesion.  3 biopsies were taken of all PI-RADS lesions.    We then did a standard biopsy.  Biopsies were taken from the right anterior  apex, right anterior apex base, right lateral, right posterior apex, right posterior base, midline apex and midline base, left anterior  apex, left anterior apex base, left lateral, left posterior apex, left  posterior base    After all biopsies were taken the patient did tolerate this well the patient was then

## 2025-02-26 LAB — SURGICAL PATHOLOGY REPORT: NORMAL

## 2025-03-11 ENCOUNTER — TRANSCRIBE ORDERS (OUTPATIENT)
Dept: ADMINISTRATIVE | Age: 60
End: 2025-03-11

## 2025-03-11 DIAGNOSIS — C61 MALIGNANT NEOPLASM OF PROSTATE (HCC): Primary | ICD-10-CM

## 2025-04-04 LAB — SURGICAL PATHOLOGY REPORT: NORMAL

## 2025-04-08 ENCOUNTER — HOSPITAL ENCOUNTER (OUTPATIENT)
Dept: NUCLEAR MEDICINE | Age: 60
Discharge: HOME | End: 2025-04-08
Attending: UROLOGY
Payer: COMMERCIAL

## 2025-04-08 ENCOUNTER — HOSPITAL ENCOUNTER (OUTPATIENT)
Dept: CT IMAGING | Age: 60
Discharge: HOME | End: 2025-04-10
Attending: UROLOGY
Payer: COMMERCIAL

## 2025-04-08 DIAGNOSIS — C61 MALIGNANT NEOPLASM OF PROSTATE (HCC): ICD-10-CM

## 2025-04-08 PROCEDURE — A9503 TC99M MEDRONATE: HCPCS | Performed by: RADIOLOGY

## 2025-04-08 PROCEDURE — 74176 CT ABD & PELVIS W/O CONTRAST: CPT

## 2025-04-08 PROCEDURE — 3430000000 HC RX DIAGNOSTIC RADIOPHARMACEUTICAL: Performed by: RADIOLOGY

## 2025-04-08 PROCEDURE — 78306 BONE IMAGING WHOLE BODY: CPT | Performed by: UROLOGY

## 2025-04-08 RX ORDER — TC 99M MEDRONATE 20 MG/10ML
25 INJECTION, POWDER, LYOPHILIZED, FOR SOLUTION INTRAVENOUS
Status: COMPLETED | OUTPATIENT
Start: 2025-04-08 | End: 2025-04-08

## 2025-04-08 RX ADMIN — TC 99M MEDRONATE 25 MILLICURIE: 20 INJECTION, POWDER, LYOPHILIZED, FOR SOLUTION INTRAVENOUS at 07:31

## (undated) DEVICE — PAD BIOP 1X1IN SPONGE

## (undated) DEVICE — DRAPE,REIN 53X77,STERILE: Brand: MEDLINE

## (undated) DEVICE — NEEDLE SPNL 22GA L7IN BLK HUB S STL W/ QNCKE PNT W/OUT

## (undated) DEVICE — TOWEL,OR,DSP,ST,BLUE,STD,6/PK,12PK/CS: Brand: MEDLINE

## (undated) DEVICE — STANDARD HYPODERMIC NEEDLE,ALUMINUM HUB: Brand: MONOJECT

## (undated) DEVICE — MARKER,SKIN,WI/RULER AND LABELS: Brand: MEDLINE

## (undated) DEVICE — SYRINGE MED 10ML LUERLOCK TIP W/O SFTY DISP

## (undated) DEVICE — JELLY,LUBE,STERILE,FLIP TOP,TUBE,2-OZ: Brand: MEDLINE

## (undated) DEVICE — SKN PREP SPNG STKS PVP PNT STR: Brand: MEDLINE INDUSTRIES, INC.

## (undated) DEVICE — MAX-CORE® DISPOSABLE CORE BIOPSY INSTRUMENT, 18G X 25CM: Brand: MAX-CORE

## (undated) DEVICE — GEL US 20GM NONIRRITATING OVERWRAPPED FILE PCH TRNSMIT